# Patient Record
Sex: MALE | Race: WHITE | NOT HISPANIC OR LATINO | Employment: UNEMPLOYED | URBAN - METROPOLITAN AREA
[De-identification: names, ages, dates, MRNs, and addresses within clinical notes are randomized per-mention and may not be internally consistent; named-entity substitution may affect disease eponyms.]

---

## 2023-10-22 ENCOUNTER — HOSPITAL ENCOUNTER (EMERGENCY)
Facility: HOSPITAL | Age: 48
Discharge: PSYCHIATRIC OR SUBSTANCE USE FACILITY | End: 2023-10-23
Attending: FAMILY MEDICINE
Payer: COMMERCIAL

## 2023-10-22 DIAGNOSIS — R45.851 SUICIDAL IDEATIONS: Primary | ICD-10-CM

## 2023-10-22 LAB
ALBUMIN SERPL BCP-MCNC: 3.9 G/DL (ref 3.5–5.2)
ALP SERPL-CCNC: 153 U/L (ref 55–135)
ALT SERPL W/O P-5'-P-CCNC: 38 U/L (ref 10–44)
AMPHET+METHAMPHET UR QL: NEGATIVE
ANION GAP SERPL CALC-SCNC: 12 MMOL/L (ref 8–16)
APAP SERPL-MCNC: <3 UG/ML (ref 10–20)
AST SERPL-CCNC: 32 U/L (ref 10–40)
BACTERIA #/AREA URNS HPF: ABNORMAL /HPF
BARBITURATES UR QL SCN>200 NG/ML: NEGATIVE
BASOPHILS # BLD AUTO: 0.05 K/UL (ref 0–0.2)
BASOPHILS NFR BLD: 0.8 % (ref 0–1.9)
BENZODIAZ UR QL SCN>200 NG/ML: NEGATIVE
BILIRUB SERPL-MCNC: 0.3 MG/DL (ref 0.1–1)
BILIRUB UR QL STRIP: NEGATIVE
BUN SERPL-MCNC: 7 MG/DL (ref 6–20)
BZE UR QL SCN: NEGATIVE
CALCIUM SERPL-MCNC: 9 MG/DL (ref 8.7–10.5)
CANNABINOIDS UR QL SCN: NEGATIVE
CHLORIDE SERPL-SCNC: 103 MMOL/L (ref 95–110)
CLARITY UR: CLEAR
CO2 SERPL-SCNC: 25 MMOL/L (ref 23–29)
COLOR UR: COLORLESS
CREAT SERPL-MCNC: 1 MG/DL (ref 0.5–1.4)
CREAT UR-MCNC: 56.5 MG/DL (ref 23–375)
DIFFERENTIAL METHOD: NORMAL
EOSINOPHIL # BLD AUTO: 0.2 K/UL (ref 0–0.5)
EOSINOPHIL NFR BLD: 2.9 % (ref 0–8)
ERYTHROCYTE [DISTWIDTH] IN BLOOD BY AUTOMATED COUNT: 13.1 % (ref 11.5–14.5)
EST. GFR  (NO RACE VARIABLE): >60 ML/MIN/1.73 M^2
ETHANOL SERPL-MCNC: <10 MG/DL
GLUCOSE SERPL-MCNC: 83 MG/DL (ref 70–110)
GLUCOSE UR QL STRIP: NEGATIVE
HCT VFR BLD AUTO: 44.4 % (ref 40–54)
HCV AB SERPL QL IA: NEGATIVE
HEP C VIRUS HOLD SPECIMEN: NORMAL
HGB BLD-MCNC: 15 G/DL (ref 14–18)
HGB UR QL STRIP: NEGATIVE
IMM GRANULOCYTES # BLD AUTO: 0.02 K/UL (ref 0–0.04)
IMM GRANULOCYTES NFR BLD AUTO: 0.3 % (ref 0–0.5)
KETONES UR QL STRIP: NEGATIVE
LEUKOCYTE ESTERASE UR QL STRIP: ABNORMAL
LYMPHOCYTES # BLD AUTO: 2.2 K/UL (ref 1–4.8)
LYMPHOCYTES NFR BLD: 36.5 % (ref 18–48)
MCH RBC QN AUTO: 28.1 PG (ref 27–31)
MCHC RBC AUTO-ENTMCNC: 33.8 G/DL (ref 32–36)
MCV RBC AUTO: 83 FL (ref 82–98)
METHADONE UR QL SCN>300 NG/ML: NEGATIVE
MICROSCOPIC COMMENT: ABNORMAL
MONOCYTES # BLD AUTO: 0.5 K/UL (ref 0.3–1)
MONOCYTES NFR BLD: 8 % (ref 4–15)
NEUTROPHILS # BLD AUTO: 3.2 K/UL (ref 1.8–7.7)
NEUTROPHILS NFR BLD: 51.5 % (ref 38–73)
NITRITE UR QL STRIP: NEGATIVE
NRBC BLD-RTO: 0 /100 WBC
OPIATES UR QL SCN: NEGATIVE
PCP UR QL SCN>25 NG/ML: NEGATIVE
PH UR STRIP: 7 [PH] (ref 5–8)
PLATELET # BLD AUTO: 254 K/UL (ref 150–450)
PMV BLD AUTO: 10.1 FL (ref 9.2–12.9)
POTASSIUM SERPL-SCNC: 3.7 MMOL/L (ref 3.5–5.1)
PROT SERPL-MCNC: 6.8 G/DL (ref 6–8.4)
PROT UR QL STRIP: NEGATIVE
RBC # BLD AUTO: 5.34 M/UL (ref 4.6–6.2)
RBC #/AREA URNS HPF: 0 /HPF (ref 0–4)
SARS-COV-2 RDRP RESP QL NAA+PROBE: NEGATIVE
SODIUM SERPL-SCNC: 140 MMOL/L (ref 136–145)
SP GR UR STRIP: 1.01 (ref 1–1.03)
TOXICOLOGY INFORMATION: NORMAL
TSH SERPL DL<=0.005 MIU/L-ACNC: 2.18 UIU/ML (ref 0.4–4)
URN SPEC COLLECT METH UR: ABNORMAL
UROBILINOGEN UR STRIP-ACNC: NEGATIVE EU/DL
WBC # BLD AUTO: 6.13 K/UL (ref 3.9–12.7)
WBC #/AREA URNS HPF: 33 /HPF (ref 0–5)
WBC CLUMPS URNS QL MICRO: ABNORMAL

## 2023-10-22 PROCEDURE — 85025 COMPLETE CBC W/AUTO DIFF WBC: CPT | Performed by: FAMILY MEDICINE

## 2023-10-22 PROCEDURE — 80307 DRUG TEST PRSMV CHEM ANLYZR: CPT | Performed by: FAMILY MEDICINE

## 2023-10-22 PROCEDURE — 80053 COMPREHEN METABOLIC PANEL: CPT | Performed by: FAMILY MEDICINE

## 2023-10-22 PROCEDURE — U0002 COVID-19 LAB TEST NON-CDC: HCPCS | Performed by: FAMILY MEDICINE

## 2023-10-22 PROCEDURE — 87086 URINE CULTURE/COLONY COUNT: CPT | Performed by: FAMILY MEDICINE

## 2023-10-22 PROCEDURE — 86803 HEPATITIS C AB TEST: CPT | Performed by: FAMILY MEDICINE

## 2023-10-22 PROCEDURE — 80143 DRUG ASSAY ACETAMINOPHEN: CPT | Performed by: FAMILY MEDICINE

## 2023-10-22 PROCEDURE — 99285 EMERGENCY DEPT VISIT HI MDM: CPT

## 2023-10-22 PROCEDURE — 84443 ASSAY THYROID STIM HORMONE: CPT | Performed by: FAMILY MEDICINE

## 2023-10-22 PROCEDURE — 82077 ASSAY SPEC XCP UR&BREATH IA: CPT | Performed by: FAMILY MEDICINE

## 2023-10-22 PROCEDURE — 81000 URINALYSIS NONAUTO W/SCOPE: CPT | Performed by: FAMILY MEDICINE

## 2023-10-23 ENCOUNTER — HOSPITAL ENCOUNTER (INPATIENT)
Facility: HOSPITAL | Age: 48
LOS: 10 days | Discharge: HOME | DRG: 885 | End: 2023-11-02
Attending: STUDENT IN AN ORGANIZED HEALTH CARE EDUCATION/TRAINING PROGRAM | Admitting: STUDENT IN AN ORGANIZED HEALTH CARE EDUCATION/TRAINING PROGRAM
Payer: COMMERCIAL

## 2023-10-23 VITALS
OXYGEN SATURATION: 99 % | SYSTOLIC BLOOD PRESSURE: 147 MMHG | BODY MASS INDEX: 24.11 KG/M2 | HEART RATE: 83 BPM | TEMPERATURE: 98 F | HEIGHT: 70 IN | DIASTOLIC BLOOD PRESSURE: 93 MMHG | RESPIRATION RATE: 18 BRPM

## 2023-10-23 DIAGNOSIS — F33.2 MDD (MAJOR DEPRESSIVE DISORDER), RECURRENT SEVERE, WITHOUT PSYCHOSIS: Primary | ICD-10-CM

## 2023-10-23 DIAGNOSIS — R45.851 SUICIDAL IDEATION: ICD-10-CM

## 2023-10-23 PROBLEM — N39.0 COMPLICATED UTI (URINARY TRACT INFECTION): Status: ACTIVE | Noted: 2023-10-23

## 2023-10-23 PROBLEM — F17.200 TOBACCO USE DISORDER: Status: ACTIVE | Noted: 2023-10-23

## 2023-10-23 PROBLEM — R82.90 ABNORMAL URINALYSIS: Status: ACTIVE | Noted: 2023-10-23

## 2023-10-23 LAB
CHOLEST SERPL-MCNC: 174 MG/DL (ref 120–199)
CHOLEST/HDLC SERPL: 5.1 {RATIO} (ref 2–5)
ESTIMATED AVG GLUCOSE: 103 MG/DL (ref 68–131)
HBA1C MFR BLD: 5.2 % (ref 4–5.6)
HDLC SERPL-MCNC: 34 MG/DL (ref 40–75)
HDLC SERPL: 19.5 % (ref 20–50)
LDLC SERPL CALC-MCNC: 104.6 MG/DL (ref 63–159)
NONHDLC SERPL-MCNC: 140 MG/DL
TRIGL SERPL-MCNC: 177 MG/DL (ref 30–150)

## 2023-10-23 PROCEDURE — 25000003 PHARM REV CODE 250: Performed by: STUDENT IN AN ORGANIZED HEALTH CARE EDUCATION/TRAINING PROGRAM

## 2023-10-23 PROCEDURE — 83036 HEMOGLOBIN GLYCOSYLATED A1C: CPT | Performed by: STUDENT IN AN ORGANIZED HEALTH CARE EDUCATION/TRAINING PROGRAM

## 2023-10-23 PROCEDURE — 36415 COLL VENOUS BLD VENIPUNCTURE: CPT | Performed by: STUDENT IN AN ORGANIZED HEALTH CARE EDUCATION/TRAINING PROGRAM

## 2023-10-23 PROCEDURE — 99223 PR INITIAL HOSPITAL CARE,LEVL III: ICD-10-PCS | Mod: ,,, | Performed by: PSYCHIATRY & NEUROLOGY

## 2023-10-23 PROCEDURE — 90833 PSYTX W PT W E/M 30 MIN: CPT | Mod: ,,, | Performed by: PSYCHIATRY & NEUROLOGY

## 2023-10-23 PROCEDURE — 11400000 HC PSYCH PRIVATE ROOM

## 2023-10-23 PROCEDURE — 99231 SBSQ HOSP IP/OBS SF/LOW 25: CPT | Mod: ,,, | Performed by: PHYSICIAN ASSISTANT

## 2023-10-23 PROCEDURE — 25000003 PHARM REV CODE 250: Performed by: PSYCHIATRY & NEUROLOGY

## 2023-10-23 PROCEDURE — 80061 LIPID PANEL: CPT | Performed by: STUDENT IN AN ORGANIZED HEALTH CARE EDUCATION/TRAINING PROGRAM

## 2023-10-23 PROCEDURE — 90833 PR PSYCHOTHERAPY W/PATIENT W/E&M, 30 MIN (ADD ON): ICD-10-PCS | Mod: ,,, | Performed by: PSYCHIATRY & NEUROLOGY

## 2023-10-23 PROCEDURE — S4991 NICOTINE PATCH NONLEGEND: HCPCS | Performed by: STUDENT IN AN ORGANIZED HEALTH CARE EDUCATION/TRAINING PROGRAM

## 2023-10-23 PROCEDURE — 99231 PR SUBSEQUENT HOSPITAL CARE,LEVL I: ICD-10-PCS | Mod: ,,, | Performed by: PHYSICIAN ASSISTANT

## 2023-10-23 PROCEDURE — 99223 1ST HOSP IP/OBS HIGH 75: CPT | Mod: ,,, | Performed by: PSYCHIATRY & NEUROLOGY

## 2023-10-23 RX ORDER — OLANZAPINE 10 MG/2ML
10 INJECTION, POWDER, FOR SOLUTION INTRAMUSCULAR EVERY 8 HOURS PRN
Status: DISCONTINUED | OUTPATIENT
Start: 2023-10-23 | End: 2023-11-02 | Stop reason: HOSPADM

## 2023-10-23 RX ORDER — OLANZAPINE 10 MG/1
10 TABLET ORAL EVERY 8 HOURS PRN
Status: DISCONTINUED | OUTPATIENT
Start: 2023-10-23 | End: 2023-11-02 | Stop reason: HOSPADM

## 2023-10-23 RX ORDER — BUPROPION HYDROCHLORIDE 150 MG/1
150 TABLET ORAL DAILY
Status: DISCONTINUED | OUTPATIENT
Start: 2023-10-23 | End: 2023-10-26

## 2023-10-23 RX ORDER — TALC
6 POWDER (GRAM) TOPICAL NIGHTLY PRN
Status: DISCONTINUED | OUTPATIENT
Start: 2023-10-23 | End: 2023-11-02 | Stop reason: HOSPADM

## 2023-10-23 RX ORDER — SERTRALINE HYDROCHLORIDE 25 MG/1
25 TABLET, FILM COATED ORAL DAILY
Status: DISCONTINUED | OUTPATIENT
Start: 2023-10-23 | End: 2023-10-24

## 2023-10-23 RX ORDER — IBUPROFEN 200 MG
1 TABLET ORAL DAILY
Status: DISCONTINUED | OUTPATIENT
Start: 2023-10-23 | End: 2023-11-02 | Stop reason: HOSPADM

## 2023-10-23 RX ORDER — HYDROXYZINE HYDROCHLORIDE 25 MG/1
50 TABLET, FILM COATED ORAL EVERY 6 HOURS PRN
Status: DISCONTINUED | OUTPATIENT
Start: 2023-10-23 | End: 2023-11-02 | Stop reason: HOSPADM

## 2023-10-23 RX ADMIN — BUPROPION HYDROCHLORIDE 150 MG: 150 TABLET, EXTENDED RELEASE ORAL at 11:10

## 2023-10-23 RX ADMIN — SERTRALINE HYDROCHLORIDE 25 MG: 25 TABLET ORAL at 11:10

## 2023-10-23 RX ADMIN — HYDROXYZINE HYDROCHLORIDE 50 MG: 25 TABLET, FILM COATED ORAL at 04:10

## 2023-10-23 RX ADMIN — NICOTINE 1 PATCH: 14 PATCH, EXTENDED RELEASE TRANSDERMAL at 09:10

## 2023-10-23 NOTE — PLAN OF CARE
Lying quietly in bed, eyes closed, respirations even, unlabored. Apparently asleep. Slept 1 hour thus far without interruption. Patient was a late admit.                                                                               Safety precautions maintained. Rounds done every 15 minutes. Bed is fixed in low position and room is uncluttered and pathways are clear.

## 2023-10-23 NOTE — NURSING
Wheeled to 2nd floor then ambulated steadily on to unit after scanned with [employer] Proscreen 200.  Introduced to staff and oriented pt to unit.  Full skin assessment done.  No contraband found.

## 2023-10-23 NOTE — NURSING
"Nursing assessment completed.  Admits to feeling depressed but said he's too tired to discuss stressors.  Denies homicidal thoughts.  When asked about suicidal thoughts, said "I'm in between right now.  I don't know.  I need some sleep".  States he attempted to hang self and kill self with carbon monoxide poisoning 2 years ago.  Rated anxiety "5/10" and depression "10/10".  Flat affect.  Said he thinks he accidentally overdosed the other night "on meth".  "I was at [facility] where I lost my dentures".  Dentures x 4 years.  Decreased sleep and decreased appetite.  Denies hallucinations "right now".  States he wants to be here and wants help.  At least 2 previous psychiatric admissions in life.  Goal: "Improved mood and more.  I'm going to have to think about that".  Lives alone in apartment.  Hoping he still has it when he's discharged.  "It was messy when I left".  Rent paid up until November 1st.  "I pay him cash".  Red bumps noted on arms and chest.  He said these itch him.  Hydroxyzine 50mg po given.  Scratches noted to arms and legs and feet.  Small burn noted to left forearm.  2 scabs noted to face.  Pt said [place] the woods.  Hx HTN.  Denies any home meds.  Smokes about 20 cigarettes daily.  Denies ETOH use.  Said the other day he used "meth for the 3rd time in 2 years".      Plan of care reviewed with pt.    " Yes 08-Mar-2021

## 2023-10-23 NOTE — NURSING
Report called by Elaine Mendoza at .  Medically cleared by Dr Eagle.  Last /93, P81, T98.4, R18, O2 sat 99%.  NKA.  SI.  Relapsed on meth last week and having a hard time since.  COVID negative.  Labs insignificant.  Very pleasant and cooperative.

## 2023-10-23 NOTE — PLAN OF CARE
Verbal contract for safety obtained. VSS.  Flat, restricted affect. Isolating to self. Ambulates. Encouraged to attend groups. Accepts meals. Monitored Q/15 minutes for fall and safety precautions per staff.  Will continue to monitor for needs and safety.

## 2023-10-23 NOTE — HPI
Patient is a 48 year old male with medical history of anxiety, depression and tobacco use disorder who was admitted for suicidal ideation.  Hospital medicine consulted for medical management.

## 2023-10-23 NOTE — ED PROVIDER NOTES
SCRIBE #1 NOTE: I, Doc Monik, am scribing for, and in the presence of, Kiya Eagle MD. I have scribed the entire note.       History     Chief Complaint   Patient presents with    Psychiatric Evaluation     Pt to ED via [facility] reports pt SI w/o plan. NAD. Mental health hx. Off meds for unk length of time.     Review of patient's allergies indicates:  No Known Allergies      History of Present Illness     HPI    10/22/2023, 8:38 PM  History obtained from the patient      History of Present Illness: Josh Tucker is a 48 y.o. male patient with a PMHx of substance abuse who presents to the Emergency Department for psychiatric evaluation. Symptoms are constant and moderate in severity. No mitigating or exacerbating factors reported. Associated sxs include SI without a plan which onset 2 days ago. Patient denies any AVH, HI, CP, SOB, n/v/d, dysuria, HA, and all other sxs at this time. No further complaints or concerns at this time.       Arrival mode: [facility]    PCP: No, Primary Doctor        Past Medical History:  No past medical history on file.    Past Surgical History:  No past surgical history on file.      Family History:  No family history on file.    Social History:  Social History     Tobacco Use    Smoking status: Every Day     Current packs/day: 1.00     Types: Cigarettes    Smokeless tobacco: Current     Types: Snuff   Substance and Sexual Activity    Alcohol use: Yes     Alcohol/week: 6.0 standard drinks of alcohol     Types: 6 Cans of beer per week    Drug use: Yes     Types: Methamphetamines    Sexual activity: Not on file        Review of Systems     Review of Systems   Constitutional:  Negative for fever.   HENT:  Negative for sore throat.    Respiratory:  Negative for shortness of breath.    Cardiovascular:  Negative for chest pain.   Gastrointestinal:  Negative for nausea.   Genitourinary:  Negative for dysuria.   Musculoskeletal:  Negative for back pain.   Skin:  Negative for rash.  "  Neurological:  Negative for weakness.   Hematological:  Does not bruise/bleed easily.   Psychiatric/Behavioral:  Positive for suicidal ideas. Negative for dysphoric mood and hallucinations.         (-) HI   All other systems reviewed and are negative.       Physical Exam     Initial Vitals [10/22/23 1930]   BP Pulse Resp Temp SpO2   (!) 154/101 77 14 98.1 °F (36.7 °C) 98 %      MAP       --          Physical Exam   Nursing Notes and Vital Signs Reviewed.  Constitutional: Patient is in no acute distress. Well-developed and well-nourished.  Head: Atraumatic. Normocephalic.  Eyes: PERRL. EOM intact. Conjunctivae are not pale. No scleral icterus.  ENT: Mucous membranes are moist. Oropharynx is clear and symmetric.    Neck: Supple. Full ROM. No lymphadenopathy.  Cardiovascular: Regular rate. Regular rhythm. No murmurs, rubs, or gallops. Distal pulses are 2+ and symmetric.  Pulmonary/Chest: No respiratory distress. Clear to auscultation bilaterally. No wheezing or rales.  Abdominal: Soft and non-distended.  There is no tenderness.  No rebound, guarding, or rigidity. Good bowel sounds.  Genitourinary: No CVA tenderness  Musculoskeletal: Moves all extremities. No obvious deformities. No edema. No calf tenderness.  Skin: Warm and dry.  Neurological:  Alert, awake, and appropriate.  Normal speech.  No acute focal neurological deficits are appreciated.  Psychiatric: A&Ox3. Cooperative. Negative for delusions, AVH. Positive for SI     ED Course   Procedures  ED Vital Signs:  Vitals:    10/22/23 1930   BP: (!) 154/101   Pulse: 77   Resp: 14   Temp: 98.1 °F (36.7 °C)   TempSrc: Oral   SpO2: 98%   Height: 5' 10" (1.778 m)       Abnormal Lab Results:  Labs Reviewed   COMPREHENSIVE METABOLIC PANEL - Abnormal; Notable for the following components:       Result Value    Alkaline Phosphatase 153 (*)     All other components within normal limits    Narrative:     Release to patient->Immediate   URINALYSIS, REFLEX TO URINE CULTURE - " Abnormal; Notable for the following components:    Color, UA Colorless (*)     Leukocytes, UA 2+ (*)     All other components within normal limits    Narrative:     Specimen Source->Urine   ACETAMINOPHEN LEVEL - Abnormal; Notable for the following components:    Acetaminophen (Tylenol), Serum <3.0 (*)     All other components within normal limits    Narrative:     Release to patient->Immediate   URINALYSIS MICROSCOPIC - Abnormal; Notable for the following components:    WBC, UA 33 (*)     WBC Clumps, UA Few (*)     All other components within normal limits    Narrative:     Specimen Source->Urine   CULTURE, URINE   HEPATITIS C ANTIBODY    Narrative:     Release to patient->Immediate   HEP C VIRUS HOLD SPECIMEN    Narrative:     Release to patient->Immediate   CBC W/ AUTO DIFFERENTIAL    Narrative:     Release to patient->Immediate   TSH    Narrative:     Release to patient->Immediate   DRUG SCREEN PANEL, URINE EMERGENCY    Narrative:     Specimen Source->Urine   ALCOHOL,MEDICAL (ETHANOL)    Narrative:     Release to patient->Immediate   SARS-COV-2 RNA AMPLIFICATION, QUAL   HIV 1 / 2 ANTIBODY        All Lab Results:  Results for orders placed or performed during the hospital encounter of 10/22/23   Hepatitis C Antibody   Result Value Ref Range    Hepatitis C Ab Negative Negative   HCV Virus Hold Specimen   Result Value Ref Range    HEP C Virus Hold Specimen Hold for HCV sendout    CBC auto differential   Result Value Ref Range    WBC 6.13 3.90 - 12.70 K/uL    RBC 5.34 4.60 - 6.20 M/uL    Hemoglobin 15.0 14.0 - 18.0 g/dL    Hematocrit 44.4 40.0 - 54.0 %    MCV 83 82 - 98 fL    MCH 28.1 27.0 - 31.0 pg    MCHC 33.8 32.0 - 36.0 g/dL    RDW 13.1 11.5 - 14.5 %    Platelets 254 150 - 450 K/uL    MPV 10.1 9.2 - 12.9 fL    Immature Granulocytes 0.3 0.0 - 0.5 %    Gran # (ANC) 3.2 1.8 - 7.7 K/uL    Immature Grans (Abs) 0.02 0.00 - 0.04 K/uL    Lymph # 2.2 1.0 - 4.8 K/uL    Mono # 0.5 0.3 - 1.0 K/uL    Eos # 0.2 0.0 - 0.5 K/uL     Baso # 0.05 0.00 - 0.20 K/uL    nRBC 0 0 /100 WBC    Gran % 51.5 38.0 - 73.0 %    Lymph % 36.5 18.0 - 48.0 %    Mono % 8.0 4.0 - 15.0 %    Eosinophil % 2.9 0.0 - 8.0 %    Basophil % 0.8 0.0 - 1.9 %    Differential Method Automated    Comprehensive metabolic panel   Result Value Ref Range    Sodium 140 136 - 145 mmol/L    Potassium 3.7 3.5 - 5.1 mmol/L    Chloride 103 95 - 110 mmol/L    CO2 25 23 - 29 mmol/L    Glucose 83 70 - 110 mg/dL    BUN 7 6 - 20 mg/dL    Creatinine 1.0 0.5 - 1.4 mg/dL    Calcium 9.0 8.7 - 10.5 mg/dL    Total Protein 6.8 6.0 - 8.4 g/dL    Albumin 3.9 3.5 - 5.2 g/dL    Total Bilirubin 0.3 0.1 - 1.0 mg/dL    Alkaline Phosphatase 153 (H) 55 - 135 U/L    AST 32 10 - 40 U/L    ALT 38 10 - 44 U/L    eGFR >60 >60 mL/min/1.73 m^2    Anion Gap 12 8 - 16 mmol/L   TSH   Result Value Ref Range    TSH 2.177 0.400 - 4.000 uIU/mL   Urinalysis, Reflex to Urine Culture Urine, Clean Catch    Specimen: Urine   Result Value Ref Range    Specimen UA Urine, Clean Catch     Color, UA Colorless (A) Yellow, Straw, Tsih    Appearance, UA Clear Clear    pH, UA 7.0 5.0 - 8.0    Specific Gravity, UA 1.010 1.005 - 1.030    Protein, UA Negative Negative    Glucose, UA Negative Negative    Ketones, UA Negative Negative    Bilirubin (UA) Negative Negative    Occult Blood UA Negative Negative    Nitrite, UA Negative Negative    Urobilinogen, UA Negative <2.0 EU/dL    Leukocytes, UA 2+ (A) Negative   Drug screen panel, emergency   Result Value Ref Range    Benzodiazepines Negative Negative    Methadone metabolites Negative Negative    Cocaine (Metab.) Negative Negative    Opiate Scrn, Ur Negative Negative    Barbiturate Screen, Ur Negative Negative    Amphetamine Screen, Ur Negative Negative    THC Negative Negative    Phencyclidine Negative Negative    Creatinine, Urine 56.5 23.0 - 375.0 mg/dL    Toxicology Information SEE COMMENT    Ethanol   Result Value Ref Range    Alcohol, Serum <10 <10 mg/dL   Acetaminophen level    Result Value Ref Range    Acetaminophen (Tylenol), Serum <3.0 (L) 10.0 - 20.0 ug/mL   COVID-19 Rapid Screening   Result Value Ref Range    SARS-CoV-2 RNA, Amplification, Qual Negative Negative   Urinalysis Microscopic   Result Value Ref Range    RBC, UA 0 0 - 4 /hpf    WBC, UA 33 (H) 0 - 5 /hpf    WBC Clumps, UA Few (A) None-Rare    Bacteria Rare None-Occ /hpf    Microscopic Comment SEE COMMENT          Imaging Results:  Imaging Results    None            The Emergency Provider reviewed the vital signs and test results, which are outlined above.     ED Discussion     8:21 PM: The PEC hold has been issued by Dr. Eagle at this time for grave disability.    8:41 PM: Pt has been medically cleared by Dr. Eagle at this time. Reassessed pt at this time. Pt is resting comfortably and appears in no acute distress. There are no psychiatric services offered at this facility. D/w pt all pertinent ED information and plan to transfer to psychiatric facility for psychiatric treatment. Pt verbalizes understanding. Patient being transferred by [facility] for ongoing personal protection en route. Pt has been made aware of all risks and benefits associated with transfer, including but not limited to death, MVC, loss of vital signs, and/or permanent disability. Benefits include ability to be treated at an inpatient psychiatric facility. Pt will be transported by personnel trained in CPR and CPI. Patient understands that there could be unforeseen motor vehicle accidents, inclement weather, or loss of vital signs that could result in potential death or permanent disability. All questions and complaints have been addressed at this time. Pt condition is stable at this time and is clear to transfer to psychiatric facility at this time.            Medical Decision Making  Amount and/or Complexity of Data Reviewed  Labs: ordered. Decision-making details documented in ED Course.                 ED Medication(s):  Medications - No data to  display    New Prescriptions    No medications on file               Scribe Attestation:   Scribe #1: I performed the above scribed service and the documentation accurately describes the services I performed. I attest to the accuracy of the note.     Attending:   Physician Attestation Statement for Scribe #1: I, Kiya Eagle MD, personally performed the services described in this documentation, as scribed by Doc Ruggiero, in my presence, and it is both accurate and complete.           Clinical Impression       ICD-10-CM ICD-9-CM   1. Suicidal ideations  R45.851 V62.84       Disposition:   Disposition: Transferred  Condition: Stable         Kiya Eagle MD  10/22/23 7762

## 2023-10-23 NOTE — H&P
[facility] Hospital Medicine  History & Physical    Patient Name: Josh Tucker  MRN: [id number]  Patient Class: IP- Psych  Admission Date: 10/23/2023  Attending Physician: Bao Bear III, MD   Primary Care Provider: Lesa Primary Doctor         Patient information was obtained from patient and ER records.     Subjective:     Principal Problem:Suicidal ideation    Chief Complaint: No chief complaint on file.       HPI: Patient is a 48 year old male with medical history of anxiety, depression and tobacco use disorder who was admitted for suicidal ideation.  Hospital medicine consulted for medical management.              No past medical history on file.    No past surgical history on file.    Review of patient's allergies indicates:  No Known Allergies    No current facility-administered medications on file prior to encounter.     No current outpatient medications on file prior to encounter.     Family History    None       Tobacco Use    Smoking status: Every Day     Current packs/day: 1.00     Types: Cigarettes    Smokeless tobacco: Current     Types: Snuff   Substance and Sexual Activity    Alcohol use: Yes     Alcohol/week: 6.0 standard drinks of alcohol     Types: 6 Cans of beer per week    Drug use: Yes     Types: Methamphetamines    Sexual activity: Not on file     Review of Systems   Constitutional:  Negative for chills and fever.   Respiratory:  Negative for cough, shortness of breath and wheezing.    Cardiovascular:  Negative for chest pain and palpitations.   Gastrointestinal:  Negative for abdominal distention, constipation, diarrhea, nausea and vomiting.   Genitourinary:  Negative for decreased urine volume, difficulty urinating, dysuria, enuresis, flank pain, frequency, hematuria, penile discharge, penile pain and urgency.     Objective:     Vital Signs (Most Recent):  Temp: 97.9 °F (36.6 °C) (10/23/23 0738)  Pulse: 64 (10/23/23 0738)  Resp: 18 (10/23/23 0738)  BP:  128/72 (10/23/23 0738)  SpO2: 99 % (10/23/23 0330) Vital Signs (24h Range):  Temp:  [97 °F (36.1 °C)-98.4 °F (36.9 °C)] 97.9 °F (36.6 °C)  Pulse:  [60-83] 64  Resp:  [14-18] 18  SpO2:  [98 %-99 %] 99 %  BP: (128-154)/() 128/72     Weight: 76.1 kg (167 lb 14.1 oz)  Body mass index is 24.09 kg/m².     Physical Exam  Constitutional:       Appearance: Normal appearance.   HENT:      Head: Normocephalic and atraumatic.   Cardiovascular:      Rate and Rhythm: Normal rate and regular rhythm.   Pulmonary:      Effort: Pulmonary effort is normal.      Breath sounds: Normal breath sounds.   Abdominal:      General: There is no distension.      Tenderness: There is no abdominal tenderness.   Musculoskeletal:         General: No swelling or tenderness.      Cervical back: Neck supple.      Right lower leg: No edema.      Left lower leg: No edema.   Skin:     General: Skin is warm and dry.   Neurological:      Mental Status: He is alert.      Cranial Nerves: Cranial nerves 2-12 are intact.      Comments: CN II: Visual Fields full to confrontation  CN III, IV , VI PERRL   CN III: no palsy   Nystagmus: none  Diplopia: none  Ophthalmoparesis: none  CN V Facial sensation intact  CN VII facial expression full, symmetric  CN VIII normal  CN IX normal  CN X normal  CN XI normal  CN XII normal           Significant Labs: UPT  No results found for this or any previous visit.  U/A  2+ leukocytes and elevated wbc 33    UDS  Results for orders placed or performed during the hospital encounter of 10/22/23   Drug screen panel, emergency   Result Value Ref Range    Benzodiazepines Negative Negative    Methadone metabolites Negative Negative    Cocaine (Metab.) Negative Negative    Opiate Scrn, Ur Negative Negative    Barbiturate Screen, Ur Negative Negative    Amphetamine Screen, Ur Negative Negative    THC Negative Negative    Phencyclidine Negative Negative    Creatinine, Urine 56.5 23.0 - 375.0 mg/dL    Toxicology Information SEE  COMMENT      CBC  Results for orders placed or performed during the hospital encounter of 10/22/23   CBC auto differential   Result Value Ref Range    WBC 6.13 3.90 - 12.70 K/uL    RBC 5.34 4.60 - 6.20 M/uL    Hemoglobin 15.0 14.0 - 18.0 g/dL    Hematocrit 44.4 40.0 - 54.0 %    MCV 83 82 - 98 fL    MCH 28.1 27.0 - 31.0 pg    MCHC 33.8 32.0 - 36.0 g/dL    RDW 13.1 11.5 - 14.5 %    Platelets 254 150 - 450 K/uL    MPV 10.1 9.2 - 12.9 fL    Immature Granulocytes 0.3 0.0 - 0.5 %    Gran # (ANC) 3.2 1.8 - 7.7 K/uL    Immature Grans (Abs) 0.02 0.00 - 0.04 K/uL    Lymph # 2.2 1.0 - 4.8 K/uL    Mono # 0.5 0.3 - 1.0 K/uL    Eos # 0.2 0.0 - 0.5 K/uL    Baso # 0.05 0.00 - 0.20 K/uL    nRBC 0 0 /100 WBC    Gran % 51.5 38.0 - 73.0 %    Lymph % 36.5 18.0 - 48.0 %    Mono % 8.0 4.0 - 15.0 %    Eosinophil % 2.9 0.0 - 8.0 %    Basophil % 0.8 0.0 - 1.9 %    Differential Method Automated      CMP  Results for orders placed or performed during the hospital encounter of 10/22/23   Comprehensive metabolic panel   Result Value Ref Range    Sodium 140 136 - 145 mmol/L    Potassium 3.7 3.5 - 5.1 mmol/L    Chloride 103 95 - 110 mmol/L    CO2 25 23 - 29 mmol/L    Glucose 83 70 - 110 mg/dL    BUN 7 6 - 20 mg/dL    Creatinine 1.0 0.5 - 1.4 mg/dL    Calcium 9.0 8.7 - 10.5 mg/dL    Total Protein 6.8 6.0 - 8.4 g/dL    Albumin 3.9 3.5 - 5.2 g/dL    Total Bilirubin 0.3 0.1 - 1.0 mg/dL    Alkaline Phosphatase 153 (H) 55 - 135 U/L    AST 32 10 - 40 U/L    ALT 38 10 - 44 U/L    eGFR >60 >60 mL/min/1.73 m^2    Anion Gap 12 8 - 16 mmol/L     TSH  Results for orders placed or performed during the hospital encounter of 10/22/23   TSH   Result Value Ref Range    TSH 2.177 0.400 - 4.000 uIU/mL     ETOH  Results for orders placed or performed during the hospital encounter of 10/22/23   Ethanol   Result Value Ref Range    Alcohol, Serum <10 <10 mg/dL     Salicylate  No results found for this or any previous visit.  Acetaminophen  Results for orders placed or  performed during the hospital encounter of 10/22/23   Acetaminophen level   Result Value Ref Range    Acetaminophen (Tylenol), Serum <3.0 (L) 10.0 - 20.0 ug/mL             Significant Imaging: I have reviewed all pertinent imaging results/findings within the past 24 hours.    Assessment/Plan:     * Suicidal ideation  Defer to psych       Tobacco use disorder  Nicotine patch  Smoking cessation resources      Abnormal urinalysis  Urinalysis positive for 2+ leukocytes and elevated WBC 33 but patient denies all urinary complaints   Since he is asymptomatic will plan to wait for urine culture before prescribing abx.        VTE Risk Mitigation (From admission, onward)    None                         Mica Louise PA-C  Department of Hospital Medicine  [facility]

## 2023-10-23 NOTE — NURSING
Pt accepted to [facility] by Dr Bear.  Sabrina at [facility] said pt would be coming to [facility].

## 2023-10-23 NOTE — PROGRESS NOTES
"   10/23/23 1301   [facility] Group Therapy   Group Name Other  (1:1)   Specific Interventions Leisure Counseling   Participation Level Appropriate   Participation Quality Cooperative   Insight/Motivation Improved   Affect/Mood Display Depressed   Cognition Alert   Psychomotor Other (see comments)  (angelica in bed)     Patient isolating in assigned room, reports "tired, depressed, sleepy" mood, reports he is still having suicidal thoughts, "not as bad." "I relapse using meth. I need better coping skills." Patient accepted a list of 10 coping skills for substance abuse to help prevent relapse.  "

## 2023-10-23 NOTE — NURSING
Rested quietly with closed eyes and even resp for approximately 1 hour (late admit).  Modified VC and all precautions maintained.  Pathways clear and bed in low position.

## 2023-10-23 NOTE — ASSESSMENT & PLAN NOTE
Urinalysis positive for 2+ leukocytes and elevated WBC 33 but patient denies all urinary complaints   Since he is asymptomatic will plan to wait for urine culture before prescribing abx.
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
## 2023-10-23 NOTE — PROGRESS NOTES
10/23/23 1605   [facility] Group Therapy   Group Name Other  (1:1)   Participation Level None   Participation Quality Sleeping     Patient is resting in bed with his eyes closed and did not participate.

## 2023-10-23 NOTE — SUBJECTIVE & OBJECTIVE
No past medical history on file.    No past surgical history on file.    Review of patient's allergies indicates:  No Known Allergies    No current facility-administered medications on file prior to encounter.     No current outpatient medications on file prior to encounter.     Family History    None       Tobacco Use    Smoking status: Every Day     Current packs/day: 1.00     Types: Cigarettes    Smokeless tobacco: Current     Types: Snuff   Substance and Sexual Activity    Alcohol use: Yes     Alcohol/week: 6.0 standard drinks of alcohol     Types: 6 Cans of beer per week    Drug use: Yes     Types: Methamphetamines    Sexual activity: Not on file     Review of Systems   Constitutional:  Negative for chills and fever.   Respiratory:  Negative for cough, shortness of breath and wheezing.    Cardiovascular:  Negative for chest pain and palpitations.   Gastrointestinal:  Negative for abdominal distention, constipation, diarrhea, nausea and vomiting.   Genitourinary:  Negative for decreased urine volume, difficulty urinating, dysuria, enuresis, flank pain, frequency, hematuria, penile discharge, penile pain and urgency.     Objective:     Vital Signs (Most Recent):  Temp: 97.9 °F (36.6 °C) (10/23/23 0738)  Pulse: 64 (10/23/23 0738)  Resp: 18 (10/23/23 0738)  BP: 128/72 (10/23/23 0738)  SpO2: 99 % (10/23/23 0330) Vital Signs (24h Range):  Temp:  [97 °F (36.1 °C)-98.4 °F (36.9 °C)] 97.9 °F (36.6 °C)  Pulse:  [60-83] 64  Resp:  [14-18] 18  SpO2:  [98 %-99 %] 99 %  BP: (128-154)/() 128/72     Weight: 76.1 kg (167 lb 14.1 oz)  Body mass index is 24.09 kg/m².     Physical Exam  Constitutional:       Appearance: Normal appearance.   HENT:      Head: Normocephalic and atraumatic.   Cardiovascular:      Rate and Rhythm: Normal rate and regular rhythm.   Pulmonary:      Effort: Pulmonary effort is normal.      Breath sounds: Normal breath sounds.   Abdominal:      General: There is no distension.      Tenderness: There  is no abdominal tenderness.   Musculoskeletal:         General: No swelling or tenderness.      Cervical back: Neck supple.      Right lower leg: No edema.      Left lower leg: No edema.   Skin:     General: Skin is warm and dry.   Neurological:      Mental Status: He is alert.      Cranial Nerves: Cranial nerves 2-12 are intact.      Comments: CN II: Visual Fields full to confrontation  CN III, IV , VI PERRL   CN III: no palsy   Nystagmus: none  Diplopia: none  Ophthalmoparesis: none  CN V Facial sensation intact  CN VII facial expression full, symmetric  CN VIII normal  CN IX normal  CN X normal  CN XI normal  CN XII normal           Significant Labs: UPT  No results found for this or any previous visit.  U/A  2+ leukocytes and elevated wbc 33    UDS  Results for orders placed or performed during the hospital encounter of 10/22/23   Drug screen panel, emergency   Result Value Ref Range    Benzodiazepines Negative Negative    Methadone metabolites Negative Negative    Cocaine (Metab.) Negative Negative    Opiate Scrn, Ur Negative Negative    Barbiturate Screen, Ur Negative Negative    Amphetamine Screen, Ur Negative Negative    THC Negative Negative    Phencyclidine Negative Negative    Creatinine, Urine 56.5 23.0 - 375.0 mg/dL    Toxicology Information SEE COMMENT      CBC  Results for orders placed or performed during the hospital encounter of 10/22/23   CBC auto differential   Result Value Ref Range    WBC 6.13 3.90 - 12.70 K/uL    RBC 5.34 4.60 - 6.20 M/uL    Hemoglobin 15.0 14.0 - 18.0 g/dL    Hematocrit 44.4 40.0 - 54.0 %    MCV 83 82 - 98 fL    MCH 28.1 27.0 - 31.0 pg    MCHC 33.8 32.0 - 36.0 g/dL    RDW 13.1 11.5 - 14.5 %    Platelets 254 150 - 450 K/uL    MPV 10.1 9.2 - 12.9 fL    Immature Granulocytes 0.3 0.0 - 0.5 %    Gran # (ANC) 3.2 1.8 - 7.7 K/uL    Immature Grans (Abs) 0.02 0.00 - 0.04 K/uL    Lymph # 2.2 1.0 - 4.8 K/uL    Mono # 0.5 0.3 - 1.0 K/uL    Eos # 0.2 0.0 - 0.5 K/uL    Baso # 0.05 0.00 -  0.20 K/uL    nRBC 0 0 /100 WBC    Gran % 51.5 38.0 - 73.0 %    Lymph % 36.5 18.0 - 48.0 %    Mono % 8.0 4.0 - 15.0 %    Eosinophil % 2.9 0.0 - 8.0 %    Basophil % 0.8 0.0 - 1.9 %    Differential Method Automated      CMP  Results for orders placed or performed during the hospital encounter of 10/22/23   Comprehensive metabolic panel   Result Value Ref Range    Sodium 140 136 - 145 mmol/L    Potassium 3.7 3.5 - 5.1 mmol/L    Chloride 103 95 - 110 mmol/L    CO2 25 23 - 29 mmol/L    Glucose 83 70 - 110 mg/dL    BUN 7 6 - 20 mg/dL    Creatinine 1.0 0.5 - 1.4 mg/dL    Calcium 9.0 8.7 - 10.5 mg/dL    Total Protein 6.8 6.0 - 8.4 g/dL    Albumin 3.9 3.5 - 5.2 g/dL    Total Bilirubin 0.3 0.1 - 1.0 mg/dL    Alkaline Phosphatase 153 (H) 55 - 135 U/L    AST 32 10 - 40 U/L    ALT 38 10 - 44 U/L    eGFR >60 >60 mL/min/1.73 m^2    Anion Gap 12 8 - 16 mmol/L     TSH  Results for orders placed or performed during the hospital encounter of 10/22/23   TSH   Result Value Ref Range    TSH 2.177 0.400 - 4.000 uIU/mL     ETOH  Results for orders placed or performed during the hospital encounter of 10/22/23   Ethanol   Result Value Ref Range    Alcohol, Serum <10 <10 mg/dL     Salicylate  No results found for this or any previous visit.  Acetaminophen  Results for orders placed or performed during the hospital encounter of 10/22/23   Acetaminophen level   Result Value Ref Range    Acetaminophen (Tylenol), Serum <3.0 (L) 10.0 - 20.0 ug/mL             Significant Imaging: I have reviewed all pertinent imaging results/findings within the past 24 hours.

## 2023-10-24 LAB — BACTERIA UR CULT: NORMAL

## 2023-10-24 PROCEDURE — 25000003 PHARM REV CODE 250: Performed by: PSYCHIATRY & NEUROLOGY

## 2023-10-24 PROCEDURE — 99233 PR SUBSEQUENT HOSPITAL CARE,LEVL III: ICD-10-PCS | Mod: ,,, | Performed by: PSYCHIATRY & NEUROLOGY

## 2023-10-24 PROCEDURE — 90833 PSYTX W PT W E/M 30 MIN: CPT | Mod: ,,, | Performed by: PSYCHIATRY & NEUROLOGY

## 2023-10-24 PROCEDURE — 11400000 HC PSYCH PRIVATE ROOM

## 2023-10-24 PROCEDURE — 90833 PR PSYCHOTHERAPY W/PATIENT W/E&M, 30 MIN (ADD ON): ICD-10-PCS | Mod: ,,, | Performed by: PSYCHIATRY & NEUROLOGY

## 2023-10-24 PROCEDURE — 99233 SBSQ HOSP IP/OBS HIGH 50: CPT | Mod: ,,, | Performed by: PSYCHIATRY & NEUROLOGY

## 2023-10-24 RX ORDER — SERTRALINE HYDROCHLORIDE 50 MG/1
50 TABLET, FILM COATED ORAL DAILY
Status: DISCONTINUED | OUTPATIENT
Start: 2023-10-25 | End: 2023-10-27

## 2023-10-24 RX ADMIN — HYDROXYZINE HYDROCHLORIDE 50 MG: 25 TABLET, FILM COATED ORAL at 08:10

## 2023-10-24 RX ADMIN — SERTRALINE HYDROCHLORIDE 25 MG: 25 TABLET ORAL at 08:10

## 2023-10-24 RX ADMIN — BUPROPION HYDROCHLORIDE 150 MG: 150 TABLET, EXTENDED RELEASE ORAL at 08:10

## 2023-10-24 NOTE — PROGRESS NOTES
"   10/24/23 1113   Assessment   Patient's Identification of the Problem Patient presents disheveled, anxious, yarning, reports "tired, sleepy" mood. Patient states his admit is due to suicidal thoughts, depression and drug use, "I was trying to get help for rehab." Patient reports he was paranoid, "I know somebody's watching me." Patient admits to negative leisure lifestyle of cigarettes, meth and alcohol(occasionally). Patient reports he is , have 2 children, 10th/GED, unemployed, homeless. Patient verbalized his main goal is to go to rehab.   Leisure Interest Watching TV;Fishing;Listen to Music;Enjoy Family/Friends  (watching sports, interest decrease lately)   Leisure Barriers Homeless;Lack of Transportation;Lack of Finances;ETOH Use;Loss of Interest;Drug Use;Fears/Phobias  ("I know somebody's watching me.)   Treatment Focus To Improve Mood;To Increase Motivation;To Improve Leisure Awareness/Lifestyle/Interest;To Improve Coping Skills;To Promote Successful and Safe Self Expression;To Educate and Increase Awareness of Sober Free Activities       Treatment Recommendation:   1:1 Intervention (as needed)    Cognitive Stimulation Skilled Activity  Mild Exercises Skilled Activity  Coping Skilled Activity  Leisure Education and Awareness Skilled Activity    Treatment Goal(s):  Long Term Goals Refer To Master Treatment Plan    Short Term Treatment Goal(s)  Patient Will:  Comply with Treatment  Exhibit Improvement in Mood  Demonstrate Constructive Expression of Feelings and Behavior  Verbalize Improvement in Mood  Identify at Least 2 Coping Skills or Leisure Skills to Reduce Depression and Hopelessness Upon Request from Therapist  Identify (+) Leisure / Recreation Activities that Promote Wellness and Promote a Sober Lifestyle    Discharge Recommendations:  Encourage Patient to Actively Utilize Available Community Resources to Increase Leisure Involvement to Decrease Signs and Symptoms of Illness  Encourage Patient " to Utilize Coping Skills on a Regular Basis to Reduce the Risk of Decompensating and Re-Hospitalizations  AA/NA Meetings  Follow Up with After Care Appointments

## 2023-10-24 NOTE — PLAN OF CARE
Recommendations  1. Rec continue regular diet.   2. Rec continue monitoring weights at least weekly to assess for any acute weight changes.   3. RD to follow and make recs accordingly.    Interventions  1. Increased protein diet  2. Increased energy diet  3. Collaboration with other providers    Goals: Pt will continue to meet at least 75% ENN by RD follow up.  Nutrition Goal Status: new

## 2023-10-24 NOTE — PLAN OF CARE
"Patient reports feeling "okay" today, spending majority of time in room with minimal interaction with staff and peers. Reports occasionally having SI, but denies SI/HI at present time. Denies hallucinations at this time. Appetite adequate. Denies sleep disturbances. Medication complaint. Remains calm and cooperative. Safety precautions remain in place  "

## 2023-10-24 NOTE — PROGRESS NOTES
"   10/24/23 1130   [facility] Therapy   Group Name Other  (1:1)   Specific Interventions Leisure Counseling   Participation Level Minimal   Participation Quality Cooperative   Insight/Motivation Improved   Affect/Mood Display Anxious   Cognition Alert   Psychomotor WNL     Patient presents anxious, disheveled, reports "tired, sleepy" mood, "I just like confused right now. I'm just trying to figure all this out." Patient identified going to a 12-step program and staying healthy(eating right, exercise and no drugs) as healthy coping skills. Patient accepted a list of coping skills for substance use.  "

## 2023-10-24 NOTE — PROGRESS NOTES
"PSYCHIATRY DAILY INPATIENT PROGRESS NOTE  SUBSEQUENT HOSPITAL VISIT    ENCOUNTER DATE: 10/24/2023  SITE: [facility]    DATE OF ADMISSION: 10/23/2023  3:23 AM  LENGTH OF STAY: 1 days      CHIEF COMPLAINT   Josh Tucker is a 48 y.o. male, seen during daily persaud rounds on the inpatient unit.  Josh Tucker presented with the chief complaint of  depression/addiction/SI, "I was having suicidal thoughts."      The patient was seen and examined. The chart was reviewed.     Reviewed notes from Rns, PA, MD, CTRS, RD, and SW and labs from the last 24 hours.    The patient's case was discussed with the treatment team/care providers today including Rns, Speciality services, and SW    Staff reports no behavioral or management issues.     The patient has been compliant with treatment.      Subjective 10/24/2023       Today the patient reports that he feels about the same. He notes continued symptoms of depression.anxiety.    He discussed his toy standing h/o substance use. Although his UDS was negative, he was just recently hospitalized for drug related overdose (reportedly yin the last week).   -He appears to be having withdrawals which are complicating tx  -he would like rehab once stable      The patient denies any side effects to medications.              Psychiatric ROS (observed, reported, or endorsed/denied):  Depressed mood - Continuing  Interest/pleasure/anhedonia: Continuing  Guilt/hopelessness/worthlessness - Continuing  Changes in Sleep - variable  Changes in Appetite - variable  Changes in Concentration - variable  Changes in Energy - variable  PMA/R- Continuing  Suicidal- active/passive ideations - Continuing  Homicidal ideations: active/passive ideations - No    Hallucinations - No  Delusions - No  Disorganized behavior - No  Disorganized speech - No  Negative symptoms - No    Elevated mood - No  Decreased need for sleep - No  Grandiosity - No  Racing thoughts - No  Impulsivity - " No  Irritability- No  Increased energy - No  Distractibility - No  Increase in goal-directed activity or PMA- No    Symptoms of NATE - Continuing  Symptoms of Panic Disorder- No  Symptoms of PTSD - No        Overall progress: Patient is showing no improvement on the Unit to date        Psychotherapy:  Target symptoms: depression, anxiety , substance abuse  Why chosen therapy is appropriate versus another modality: relevant to diagnosis, patient responds to this modality, evidence based practice  Outcome monitoring methods: self-report, observation  Therapeutic intervention type: insight oriented psychotherapy, behavior modifying psychotherapy, supportive psychotherapy, interactive psychotherapy  Topics discussed/themes: building skills sets for symptom management, symptom recognition, substance abuse  The patient's response to the intervention is accepting. The patient's progress toward treatment goals is limited.   Duration of intervention: 16 minutes.        Medical ROS  General ROS: negative  Ophthalmic ROS: negative  ENT ROS: negative  Allergy and Immunology ROS: negative  Hematological and Lymphatic ROS: negative  Endocrine ROS: negative  Respiratory ROS: no cough, shortness of breath, or wheezing  Cardiovascular ROS: no chest pain or dyspnea on exertion  Gastrointestinal ROS: no abdominal pain, change in bowel habits, or black or bloody stools  Genito-Urinary ROS: no dysuria, trouble voiding, or hematuria  Musculoskeletal ROS: negative  Neurological ROS: no TIA or stroke symptoms  Dermatological ROS: negative      PAST MEDICAL HISTORY   No past medical history on file.        PSYCHOTROPIC MEDICATIONS   Scheduled Meds:   buPROPion  150 mg Oral Daily    nicotine  1 patch Transdermal Daily    sertraline  25 mg Oral Daily     Continuous Infusions:  PRN Meds:.hydrOXYzine HCL, melatonin, OLANZapine **AND** OLANZapine        EXAMINATION    VITALS   Vitals:    10/23/23 0330 10/23/23 0738 10/23/23 1922 10/24/23 0715  "  BP: 133/87 128/72 117/63 134/78   BP Location: Left arm  Right arm    Patient Position: Sitting  Sitting Lying   Pulse: 60 64 70 72   Resp: 18 18 18 18   Temp: 97 °F (36.1 °C) 97.9 °F (36.6 °C) 98 °F (36.7 °C) 97.9 °F (36.6 °C)   TempSrc: Temporal Temporal Temporal Temporal   SpO2: 99%      Weight: 76.1 kg (167 lb 14.1 oz)      Height: 5' 10" (1.778 m)          Body mass index is 24.09 kg/m².      CONSTITUTIONAL  General Appearance: unremarkable, age appropriate, older than stated age, disheveled     MUSCULOSKELETAL  Muscle Strength and Tone:no dyskinesia, no dystonia, no tremor, no tic  Abnormal Involuntary Movements: No  Gait and Station: non-ataxic     PSYCHIATRIC   Level of Consciousness: awake and alert   Orientation: person, place, time, and situation  Grooming: Casually dressed and Disheveled  Psychomotor Behavior: normal, cooperative, psychomotor retardation, eye contact normal  Speech: normal tone, normal rate, normal pitch, soft, spontaneous  Language: grossly intact, able to name, able to repeat  Mood: anxious and dysphoric  Affect: Anxious, Consistent with mood, Congruent with thought, and Constricted  Thought Process: linear, logical  Associations: intact   Thought Content: +SI, denies HI, and no delusions  Perceptions: denies AH and denies  VH  Memory: Able to recall past events, Remote intact, and Recent intact  Attention:Attends to interview without distraction  Fund of Knowledge: Aware of current events and Vocabulary appropriate   Estimate if Intelligence:  Average based on work/education history, vocabulary and mental status exam  Insight: intact, has awareness of illness- fair  Judgment: behavior is adequate to circumstances- fair    DIAGNOSTIC TESTING   Laboratory Results  No results found for this or any previous visit (from the past 24 hour(s)).          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     Nicotine Dependence  Amphetamine use disorder, severe, continuous with physiological dependence      Psychosocial stressors        PROBLEM LIST AND MANAGEMENT PLANS     Depression: pt counseled  -start trial of Wellbutrin  mg po q day- will increase to 300 mg on day 5 (on day 2)  -start adjunctive Zoloft at 25 mg po q day- increase to 50 mg po q day tomorrow (goal dose is 100-150 mg daily)     Anxiety: pt counseled  -Zoloft as above  -vistaril prn     Insomnia: pt counseled  -vistaril prn     Nicotine Dependence: pt counseled  -started/continue  nicotine 14 mg patch dermal q day     Amphetamine use disorder: pt counseled  -rehab if willing     Psychosocial stressors: pt counseled  -SW consulted to assist with resources          Discussed diagnosis, risks and benefits of proposed treatment vs alternative treatments vs no treatment, potential side effects of these treatments and the inherent unpredictability of treatment. The patient expresses understanding of the above and displays the capacity to agree with this treatment given said understanding. Patient also agrees that, currently, the benefits outweigh the risks and would like to pursue/continue treatment at this time.    Any medications being used off-label were discussed with the patient inclusive of the evidence base for the use of the medications and consent was obtained for the off-label use of the medication.       DISCHARGE PLANNING  Expected Disposition Plan: Home or Self Care/rehab      NEED FOR CONTINUED HOSPITALIZATION  Psychiatric illness continues to pose a potential threat to life or bodily function, of self or others, thereby requiring the need for continued inpatient psychiatric hospitalization: Yes, due to: danger to self, gravely disabled, and suicidal ideation, as evidenced by:  Ongoing concerns with SI. and Ongoing concerns with grave disability with patient unable to perform basic feeding, hygiene and  dressing activities without significant constant support.    Protective inpatient pyschiatric hospitalization required while a safe disposition plan is enacted: Yes    Patient stabilized and ready for discharge from inpatient psychiatric unit: No        STAFF:   Austyn Méndez MD  Psychiatry

## 2023-10-24 NOTE — MEDICAL/APP STUDENT
"PSYCHIATRY DAILY INPATIENT PROGRESS NOTE  SUBSEQUENT HOSPITAL VISIT    ENCOUNTER DATE: 10/24/2023  [facility]    DATE OF ADMISSION: 10/23/2023  3:23 AM  LENGTH OF STAY: 1 days      CHIEF COMPLAINT   Josh Tucker is a 48 y.o. male, seen during daily persaud rounds on the inpatient unit.  Josh Tucker presented with the chief complaint of depression/addiction/SI      The patient was seen and examined. The chart was reviewed.     Reviewed notes from Rns and CTRS and labs from the last 24 hours.    The patient's case was discussed with the treatment team/care providers today including {staffnotes:73804}    Staff reports {staffprobs:47015} behavioral or management issues.     The patient has been {compliance:10546} with treatment.      Subjective 10/24/2023       Today the patient reports he slept well. Patient states that he is "doing well." His mood continues to be depressed. He reports that he has not felt suicidal for the past two days. Patient describes improved appetite and states that he has no concerns about his medications.       The patient denies any side effects to medications.        Interim/overnight events per report/notes:          Psychiatric ROS (observed, reported, or endorsed/denied):  Depressed mood - Continuing  Interest/pleasure/anhedonia: Continuing  Guilt/hopelessness/worthlessness - denies  Changes in Sleep - No  Changes in Appetite - decreasing slowly  Changes in Concentration - Continuing  Changes in Energy - No  PMA/R- No  Suicidal- active/passive ideations - denies  Homicidal ideations: active/passive ideations - No    Hallucinations - No  Delusions - No  Disorganized behavior - No  Disorganized speech - No  Negative symptoms - No    Elevated mood - No  Decreased need for sleep - No  Grandiosity - No  Racing thoughts - No  Impulsivity - No  Irritability- No  Increased energy - No  Distractibility - No  Increase in goal-directed activity or PMA- No    Symptoms of " "NATE - Continuing  Symptoms of Panic Disorder- No  Symptoms of PTSD - No        Overall progress: Patient is showing mild improvement         Psychotherapy:  Target symptoms: {PSY TARGET SYMPTOMS:88963}  Why chosen therapy is appropriate versus another modality: {reason:73387}  Outcome monitoring methods: {methods:43432}  Therapeutic intervention type: {types:15555}  Topics discussed/themes: {Topics:20403}  The patient's response to the intervention is {PSY INTERVENTION RESPONSE:17857}. The patient's progress toward treatment goals is {Progress:20262}.   Duration of intervention: *** minutes.        Medical ROS  ROS      PAST MEDICAL HISTORY   No past medical history on file.        PSYCHOTROPIC MEDICATIONS   Scheduled Meds:   buPROPion  150 mg Oral Daily    nicotine  1 patch Transdermal Daily    sertraline  25 mg Oral Daily     Continuous Infusions:  PRN Meds:.hydrOXYzine HCL, melatonin, OLANZapine **AND** OLANZapine        EXAMINATION    VITALS   Vitals:    10/23/23 0330 10/23/23 0738 10/23/23 1922 10/24/23 0715   BP: 133/87 128/72 117/63 134/78   BP Location: Left arm  Right arm    Patient Position: Sitting  Sitting Lying   Pulse: 60 64 70 72   Resp: 18 18 18 18   Temp: 97 °F (36.1 °C) 97.9 °F (36.6 °C) 98 °F (36.7 °C) 97.9 °F (36.6 °C)   TempSrc: Temporal Temporal Temporal Temporal   SpO2: 99%      Weight: 76.1 kg (167 lb 14.1 oz)      Height: 5' 10" (1.778 m)          Body mass index is 24.09 kg/m².        CONSTITUTIONAL  General Appearance: {appearance:46686::"unremarkable","age appropriate"}    MUSCULOSKELETAL  Muscle Strength and Tone:{Muscle Strength:20210::"no tremor","no tic"}  Abnormal Involuntary Movements: {YES **/NO:45504::"No"}  Gait and Station: {Gait:20211::"non-ataxic"}    PSYCHIATRIC   Level of Consciousness: {FINDINGS; ANE POST LEVEL OF CONSCIOUSNESS:39419::"awake","alert "}  Orientation: {EXAM; PSYCHIATRIC ORIENTATION:20781::"person","place","situation"}  Grooming: {PSY - ROS " "APPEARANCE:20772::"Casually dressed","Well groomed"}  Psychomotor Behavior: {behavior:08239::"normal","cooperative"}  Speech: {findings; speech:99315::"normal tone","normal rate","normal pitch","normal volume"}  Language: {EXAM; AMB PSYCH LANGUAGE:20234::"grossly intact"}  Mood: {PSY - MSE MOOD:20777}  Affect: {Psych Affect:24000::"Consistent with mood"}  Thought Process: {THOUGHT_PROCESS_PSYCH:28367::"linear, logical"}  Associations: {Associations:20224::"intact"}   Thought Content: {tothoughtcontent:21145}  Perceptions: {toperceptions:91256}  Memory: {RP PSY - Memory:83367::"Able to recall past events","Remote intact","Recent intact"}  Attention:{PSY - ATTENTION/CONCENTRATION:21519::"Attends to interview without distraction"}  Fund of Knowledge: {PSY - FUND OF KNOWLEDGE:20785::"Aware of current events","Vocabulary appropriate "}  Estimate if Intelligence:  {Choices:20784::"Average"} based on work/education history, vocabulary and mental status exam  Insight: {insight:21291::"has awareness of illness"}  Judgment: {JUDGMENT:21292::"behavior is adequate to circumstances"}        DIAGNOSTIC TESTING   Laboratory Results  No results found for this or any previous visit (from the past 24 hour(s)).          MEDICAL DECISION MAKING      ASSESSMENT:   ***        PROBLEM LIST AND MANAGEMENT PLANS    ***            Discussed diagnosis, risks and benefits of proposed treatment vs alternative treatments vs no treatment, potential side effects of these treatments and the inherent unpredictability of treatment. The patient expresses understanding of the above and displays the capacity to agree with this treatment given said understanding. Patient also agrees that, currently, the benefits outweigh the risks and would like to pursue/continue treatment at this time.    Any medications being used off-label were discussed with the patient inclusive of the evidence base for the use of the medications and consent was obtained for the " off-label use of the medication.       DISCHARGE PLANNING  Expected Disposition Plan: {Select Anticipated Discharge Plan:42319}      NEED FOR CONTINUED HOSPITALIZATION  Psychiatric illness continues to pose a potential threat to life or bodily function, of self or others, thereby requiring the need for continued inpatient psychiatric hospitalization: Yes, due to: {PSY IP JUSTIFICATION FOR CONTINUED ACUTE CARE HOSPITALIZATION:38866}, as evidenced by:  {just:20538}    Protective inpatient pyschiatric hospitalization required while a safe disposition plan is enacted: Yes    Patient stabilized and ready for discharge from inpatient psychiatric unit: No        STAFF:       Psychiatry

## 2023-10-24 NOTE — PLAN OF CARE
"TREATMENT TEAM      Chief Complaint:  Pt.is a 48 year old male whom presented to the ED and reports SI without a plan     Per ED note pt.has been off medication for unknown time    Pt.stated"I stayed clean for a little while and  then I used and started seeing things"    Pt.stated" I think I overdosed and I went to [facility], they sent me home after cleaning my system out. When I made it home, I left and  I went to a motel and I came here"    Pt.reports no social supports - and he would like to transition to rehab post discharge.           Pt Goal(s):    "To go to rehab"    Current Progress:   Pt attended treatment team dressed in blue hospital scrubs    Pt affect/mood:Pt.presented broad of affect       Program/groups:    Not Attending      Revisions to Plan:    None at this time     "

## 2023-10-24 NOTE — CONSULTS
[facility]  Adult Nutrition  Consult Note    SUMMARY     Recommendations  1. Rec continue regular diet.   2. Rec continue monitoring weights at least weekly to assess for any acute weight changes.   3. RD to follow and make recs accordingly.    Interventions  1. Increased protein diet  2. Increased energy diet  3. Collaboration with other providers    Goals: Pt will continue to meet at least 75% ENN by RD follow up.  Nutrition Goal Status: new  Communication of RD Recs: other (comment) (POC)    Assessment and Plan    Nutrition Problem  Increased protein energy needs    Related to (etiology):   Conditions associated with diagnoses    Signs and Symptoms (as evidenced by):   H/o of methamphetamine abuse    Interventions/Recommendations (treatment strategy):  Recommendations  1. Rec continue regular diet.   2. Rec continue monitoring weights at least weekly to assess for any acute weight changes.   3. RD to follow and make recs accordingly.    Interventions  1. Increased protein diet  2. Increased energy diet  3. Collaboration with other providers    Goals: Pt will continue to meet at least 75% ENN by RD follow up.  Nutrition Goal Status: new    Nutrition Diagnosis Status:   New         Malnutrition Assessment           NFPE -- PEC                            Reason for Assessment    Reason For Assessment: consult  Diagnosis: psychological disorder (SI)  Relevant Medical History: anxiety, depression, HTN  Interdisciplinary Rounds: did not attend  General Information Comments:   RD consult received for 49 yo M. On regular diet -- consuming 100% of meals at this time. Pt has increased nutritional needs for substance abuse; however, patient meeting needs on regular diet. No diet order changes necessary. No recent weight hx found in chart review. Will continue to monitor for any nutrition related changes.    Nutrition Discharge Planning: Pt to dc on general healthful diet    Nutrition Risk  "Screen    Nutrition Risk Screen: no indicators present    Nutrition/Diet History    Food Allergies: NKFA  Factors Affecting Nutritional Intake: None identified at this time, other (see comments) (Patient consuming 100% of meals)    Anthropometrics    Temp: 97.9 °F (36.6 °C)  Height Method: Stated  Height: 5' 10" (177.8 cm)  Height (inches): 70 in  Weight Method: Standard Scale  Weight: 76.1 kg (167 lb 14.1 oz)  Weight (lb): 167.88 lb  Ideal Body Weight (IBW), Male: 166 lb  % Ideal Body Weight, Male (lb): 101.13 %  BMI (Calculated): 24.1  BMI Grade: 18.5-24.9 - normal       Lab/Procedures/Meds    Pertinent Labs Reviewed: reviewed  Pertinent Labs Comments: 10/22/2023 - ALP: 153 (H); 10/23/2023 - T (H)  Pertinent Medications Reviewed: reviewed  Pertinent Medications Comments: Wellbutrin, Zoloft, nicotine patch    Physical Findings/Assessment         Estimated/Assessed Needs    Weight Used For Calorie Calculations: 75.4 kg (166 lb 5.4 oz) (IBW)  Energy Calorie Requirements (kcal): 9418-0551 (30-35 kcal/kg for substance abuse)  Energy Need Method: Kcal/kg  Protein Requirements: 76-91 g (1-1.2 g/kg)  Weight Used For Protein Calculations: 76.1 kg (167 lb 12.3 oz)  Fluid Requirements (mL): 1 mL/kcal  Estimated Fluid Requirement Method: RDA Method (or per MD)  RDA Method (mL): 2264         Nutrition Prescription Ordered    Current Diet Order: regular    Evaluation of Received Nutrient/Fluid Intake    % Kcal Needs: %  % Protein Needs: %  I/O: N/A  Energy Calories Required: meeting needs  Protein Required: meeting needs  Fluid Required: meeting needs  Comments: LBM: 10/20/2023  Tolerance: tolerating  % Intake of Estimated Energy Needs: 75 - 100 %  % Meal Intake: 75 - 100 %    Nutrition Risk    Level of Risk/Frequency of Follow-up: low - moderate (1-2 x per week)       Monitor and Evaluation    Food and Nutrient Intake: energy intake, food and beverage intake  Food and Nutrient Adminstration: diet " order  Knowledge/Beliefs/Attitudes: food and nutrition knowledge/skill, beliefs and attitudes  Physical Activity and Function: nutrition-related ADLs and IADLs, factors affecting access to physical activity  Anthropometric Measurements: height/length, weight, weight change, body mass index  Biochemical Data, Medical Tests and Procedures: electrolyte and renal panel, gastrointestinal profile, glucose/endocrine profile, inflammatory profile, lipid profile       Nutrition Follow-Up    RD Follow-up?: Yes

## 2023-10-24 NOTE — PLAN OF CARE
Pt is sleeping at this time and has slept 8 hours with 1 awakening.  NAD.  Resp even & unlabored.  Pathways clear.  Q 15 minute safety checks ongoing.  All precautions maintained

## 2023-10-24 NOTE — PLAN OF CARE
"Behavioral Health Unit  Psychosocial History and Assessment  Progress Note      Patient Name: Josh Tucker YOB: 1975 SW: Idris Johnson [facility] [place] Date: 10/24/2023    Chief Complaint: depression and suicidal ideation    Consent:     Did the patient consent for an interview with the ? Yes    Did the patient consent for the  to contact family/friend/caregiver?   No    Did the patient give consent for the  to inform family/friend/caregiver of his/her whereabouts or to discuss discharge planning? No    Source of Information: Face to face with patient    Is information obtained from interviews considered reliable?   no    Reason for Admission:     Active Hospital Problems    Diagnosis  POA    *Suicidal ideation [R45.851]  Not Applicable    Abnormal urinalysis [R82.90]  Yes    Tobacco use disorder [F17.200]  Yes      Resolved Hospital Problems   No resolved problems to display.       History of Present Illness - (Patient Perception):   Pt.stated"I had relapsed on meth and started seeing stuff. I parked my vehicle somewhere on the side of road and took some sleeping pills and the next thing I know, I was in the hospital. The people in the emergency room didn't tell me much, they released me to go home. I went to a motel and then a few days later I came here. I called the ambulance and they brought me here because I was thinking about hurting myself by taking a whole bottle of pills "    History of Present Illness - (Perception of Others): Per Psychiatrist H&P:   PSYCHIATRY INPATIENT ADMISSION NOTE - H & P        10/23/2023 9:18 AM   Josh Tucker   1975   [id number]                                        DATE OF ADMISSION: 10/23/2023  3:23 AM     SITE: [facility]     CURRENT LEGAL STATUS: PEC and/or CEC        HISTORY    CHIEF COMPLAINT   Josh Tucker is a 48 y.o. male with a past psychiatric history of depression/anxiety and substance use " "currently admitted to the inpatient unit with the following chief complaint: depression/addiction/SI, "I was having suicidal thoughts."    HPI   The patient was seen and examined. The chart was reviewed.     The patient presented to the ER on 10/23/2023 . Per staff notes:  -Pt to ED via [facility] reports pt SI w/o plan. NAD. Mental health hx. Off meds for unk length of time  -Josh Tucker is a 48 y.o. male patient with a PMHx of substance abuse who presents to the Emergency Department for psychiatric evaluation. Symptoms are constant and moderate in severity. No mitigating or exacerbating factors reported. Associated sxs include SI without a plan which onset 2 days ago. Patient denies any AVH, HI, CP, SOB, n/v/d, dysuria, HA, and all other sxs at this time. No further complaints or concerns at this time  - Medically cleared by Dr Eagle.  Last /93, P81, T98.4, R18, O2 sat 99%.  NKA.  SI.  Relapsed on meth last week and having a hard time since.  COVID negative.  -Admits to feeling depressed but said he's too tired to discuss stressors.  Denies homicidal thoughts.  When asked about suicidal thoughts, said "I'm in between right now.  I don't know.  I need some sleep".  States he attempted to hang self and kill self with carbon monoxide poisoning 2 years ago.  Rated anxiety "5/10" and depression "10/10".  Flat affect.  Said he thinks he accidentally overdosed the other night "on meth".  "I was at [facility] where I lost my dentures".  Dentures x 4 years.  Decreased sleep and decreased appetite.  Denies hallucinations "right now".  States he wants to be here and wants help.  At least 2 previous psychiatric admissions in life.  Goal: "Improved mood and more.  I'm going to have to think about that".  Lives alone in apartment.  Hoping he still has it when he's discharged.  "It was messy when I left".  Rent paid up until November 1st.  "I pay him cash".  Red bumps noted on arms and chest.  He said these " "itch him.  Hydroxyzine 50mg po given.  Scratches noted to arms and legs and feet.  Small burn noted to left forearm.  2 scabs noted to face.  Pt said he was in the woods.  Hx HTN.  Denies any home meds.  Smokes about 20 cigarettes daily.  Denies ETOH use.  Said the other day he used "meth for the 3rd time in 2 years".          The patient was medically cleared and admitted to the [facility].     The patient reports a 10 year h/o depression/anxiety complicated by substance use and severe psychosocial stressors. He went into treatment about 2 years ago and was doing well with rare lapses until about 1 week ago. He had a full relapse with severe usage resulting in severe psychosocial stressors (housing, occupational, financial, and interpersonal). He reports  that he attempted suicide via overdosing within the last week. "I need help."        Symptoms of Depression: diminished mood - Yes, loss of interest/anhedonia - Yes;  recurrent - Yes, >14 days - Yes, diminished energy - Yes, change in sleep - Yes, change in appetite - Yes, diminished concentration or cognition or indecisiveness - Yes, PMA/R -  No, excessive guilt or hopelessness or worthlessness - Yes, suicidal ideations - Yes     Changes in Sleep: trouble with initiation- Yes, maintenance, - Yes early morning awakening with inability to return to sleep - No, hypersomnolence - No     Suicidal- active/passive ideations - Yes, organized plans, future intentions - No     Homicidal ideations: active/passive ideations - No, organized plans, future intentions - No     Symptoms of psychosis: hallucinations - No, delusions - No, disorganized speech - No, disorganized behavior or abnormal motor behavior - No, or negative symptoms (diminshed emotional expression, avolition, anhedonia, alogia, asociality) - No, active phase symptoms >1 month - No, continuous signs of illness > 6 months - No, since onset of illness decreased level of functioning present - No     Symptoms of kwadwo or " "hypomania: elevated, expansive, or irritable mood with increased energy or activity - No; > 4 days - No,  >7 days - No; with inflated self-esteem or grandiosity - No, decreased need for sleep - No, increased rate of speech - No, FOI or racing thoughts - No, distractibility - No, increased goal directed activity or PMA - No, risky/disinhibited behavior - No     Symptoms of NATE: excessive anxiety/worry/fear, more days than not, about numerous issues - Yes, ongoing for >6 months - Yes, difficult to control - Yes, with restlessness - Yes, fatigue - Yes, poor concentration - Yes, irritability - Yes, muscle tension - Yes, sleep disturbance - No; causes functionally impairing distress - No        Symptoms of Panic Disorder: recurrent panic attacks (palpitations/heart racing, sweating, shakiness, dyspnea, choking, chest pain/discomfort, Gi symptoms, dizzy/lightheadedness, hot/col flashes, paresthesias, derealization, fear of losing control or fear of dying or fear of "going crazy") - No, precipitated - No, un-precipitated - No, source of worry and/or behavioral changes secondary for 1 month or longer- No, agoraphobia - No     Symptoms of PTSD: h/o trauma exposure - No; re-experiencing/intrusive symptoms - No, avoidant behavior - No, 2 or more negative alterations in cognition or mood - No, 2 or more hyperarousal symptoms - No; with dissociative symptoms - No, ongoing for 1 or more  months - No     Symptoms of OCD: obsessions (recurrent thoughts/urges/images; intrusive and/or unwanted; uses other thoughts/actions to suppress) - No; compulsions (repetitive behaviors used to lower distress/anxiety/obsessions) - No, time-consuming (over 1 hour per day) or cause significant distress/impairment - - No     Symptoms of Anorexia: restriction of caloric intake leading to significantly low body weight - No, intense fear of gaining weight or persistent behavior that interferes with weight gain even thought at a significantly low weight " - No, disturbance in the way in which one's body weight or shape is experienced, undue influence of body weight or shape on self evaluation, or persistent lack of recognition of the seriousness of the current low body weight - No     Symptoms of Bulimia: recurrent episodes of binge eating (definitely larger amount  than what others would eat and lack of a sense of control over eating during episode) - No, recurrent inappropriate compensatory behaviors in order to prevent weight gain (fasting, medications, exercise, vomiting) - No, binges and compensatory behaviors both occur on average at least once a week for 3 months - No, self evaluations is unduly influenced by body shape/weight- - No     Symptoms of Binge eating: recurrent episodes of binge eating (definitely larger amount than what others would eat and lack of a sense of control over eating during episode) - No, 3 or more of following (eating much more rapidly, eating until uncomfortably full, large amounts when not hungry, eating alone because of embarrassed by how much,  feeling disgusted with oneself, depressed or very guilty afterward) - No, distress regarding binges - No, binges occur on average at least once a week for 3 months - No        Substance/s:  Taken in larger amounts or over longer periods than intended: Yes,  Persistent desire or unsuccessful attempts to cut down or stop: Yes,  Great deal of time spent seeking, using or recovering from: Yes,  Craving or strong desire to use: Yes,  Recurrent use despite failure to meet major role obligation: Yes,  Continued use despite persistent or recurrent social/interparsonal issues due to use: Yes,  Important social/work/recreational activities given up due to use: Yes,  Recurrent use in physically hazardous situations: Yes,  Continued use despite knowledge of persistent physical or psychological problem: Yes,  Tolerance (either increased need or diminished effect): Yes,        Psychotherapy:  Target  symptoms: depression, anxiety , substance abuse  Why chosen therapy is appropriate versus another modality: relevant to diagnosis, patient responds to this modality, evidence based practice  Outcome monitoring methods: self-report, observation  Therapeutic intervention type: insight oriented psychotherapy, behavior modifying psychotherapy, supportive psychotherapy, interactive psychotherapy  Topics discussed/themes: building skills sets for symptom management, symptom recognition, substance abuse  The patient's response to the intervention is accepting. The patient's progress toward treatment goals is limited.   Duration of intervention: 16 minutes.        PAST PSYCHIATRIC HISTORY  Previous Psychiatric Hospitalizations: Yes- 5 to 12; first around late 30's; last at age 46- all for depression/addiction  Previous SI/HI: Yes,  Previous Suicide Attempts: Yes,   Previous Medication Trials: Yes,  Psychiatric Care (current & past): No,current; yes on past  History of Psychotherapy: No  History of Violence: Yes,  History of sexual/physical abuse: No,     PAST MEDICAL & SURGICAL HISTORY   No past medical history on file.  No past surgical history on file.     HTN     CURRENT PSYCH MEDICATION REGIMEN   none  Current Medication side effects:  n/a  Current Medication compliance:  n/a     Previous psych meds trials  Yes- unable to name     Home Meds:   Prior to Admission medications    Not on File            OTC Meds: none     Scheduled Meds:    nicotine  1 patch Transdermal Daily      PRN Meds: hydrOXYzine HCL, melatonin, OLANZapine **AND** OLANZapine   Psychotherapeutics (From admission, onward)        Start     Stop Route Frequency Ordered     10/23/23 0331   OLANZapine tablet 10 mg  (Olanzapine PRN (</= 64 yo))        See Hyperspace for full Linked Orders Report.    -- Oral Every 8 hours PRN 10/23/23 0331     10/23/23 0331   OLANZapine injection 10 mg  (Olanzapine PRN (</= 64 yo))        See Hyperspace for full Linked Orders  Report.    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 n/a  Confucianist:Spiritual without formal affiliation  Recreational activities: none     SUBSTANCE ABUSE HISTORY   Recreational Drugs: methamphetamines   Use of Alcohol: occasional, social use  Rehab History:yes   Tobacco Use:yes     LEGAL HISTORY:   Past charges/incarcerations: YES: drug related     Pending charges:NO     FAMILY PSYCHIATRIC HISTORY   No family history on file.     denied        ROS  General ROS: negative  Ophthalmic ROS: negative  ENT ROS: negative  Allergy and Immunology ROS: negative  Hematological and Lymphatic ROS: negative  Endocrine ROS: negative  Respiratory ROS: no cough, shortness of breath, or wheezing  Cardiovascular ROS: no chest pain or dyspnea on exertion  Gastrointestinal ROS: no abdominal pain, change in bowel habits, or black or bloody stools  Genito-Urinary ROS: no dysuria, trouble voiding, or hematuria  Musculoskeletal ROS: negative  Neurological ROS: no TIA or stroke symptoms  Dermatological ROS: negative            EXAMINATION     PHYSICAL EXAM  Reviewed note/exam by Dr. Shagufta MD from 10/22/23 at 8:38 PM; FM consulted for initial physical exam- pending      VITALS       Vitals:     10/23/23 0738   BP: 128/72   Pulse: 64   Resp: 18   Temp: 97.9 °F (36.6 °C)         Body mass index is 24.09 kg/m².           PAIN  0/10  Subjective report of pain matches objective signs and symptoms: Yes     LABORATORY DATA   Recent Results         Recent Results  (from the past 72 hour(s))   Urinalysis, Reflex to Urine Culture Urine, Clean Catch     Collection Time: 10/22/23  7:55 PM     Specimen: Urine   Result Value Ref Range     Specimen UA Urine, Clean Catch       Color, UA Colorless (A) Yellow, Straw, Tish     Appearance, UA Clear Clear     pH, UA 7.0 5.0 - 8.0     Specific Gravity, UA 1.010 1.005 - 1.030     Protein, UA Negative Negative     Glucose, UA Negative Negative     Ketones, UA Negative Negative     Bilirubin (UA) Negative Negative     Occult Blood UA Negative Negative     Nitrite, UA Negative Negative     Urobilinogen, UA Negative <2.0 EU/dL     Leukocytes, UA 2+ (A) Negative   Drug screen panel, emergency     Collection Time: 10/22/23  7:55 PM   Result Value Ref Range     Benzodiazepines Negative Negative     Methadone metabolites Negative Negative     Cocaine (Metab.) Negative Negative     Opiate Scrn, Ur Negative Negative     Barbiturate Screen, Ur Negative Negative     Amphetamine Screen, Ur Negative Negative     THC Negative Negative     Phencyclidine Negative Negative     Creatinine, Urine 56.5 23.0 - 375.0 mg/dL     Toxicology Information SEE COMMENT     Urinalysis Microscopic     Collection Time: 10/22/23  7:55 PM   Result Value Ref Range     RBC, UA 0 0 - 4 /hpf     WBC, UA 33 (H) 0 - 5 /hpf     WBC Clumps, UA Few (A) None-Rare     Bacteria Rare None-Occ /hpf     Microscopic Comment SEE COMMENT     COVID-19 Rapid Screening     Collection Time: 10/22/23  7:56 PM   Result Value Ref Range     SARS-CoV-2 RNA, Amplification, Qual Negative Negative   Hepatitis C Antibody     Collection Time: 10/22/23  8:14 PM   Result Value Ref Range     Hepatitis C Ab Negative Negative   HCV Virus Hold Specimen     Collection Time: 10/22/23  8:14 PM   Result Value Ref Range     HEP C Virus Hold Specimen Hold for HCV sendout     CBC auto differential     Collection Time: 10/22/23  8:14 PM   Result Value Ref Range     WBC 6.13 3.90 - 12.70 K/uL     RBC 5.34 4.60 - 6.20  M/uL     Hemoglobin 15.0 14.0 - 18.0 g/dL     Hematocrit 44.4 40.0 - 54.0 %     MCV 83 82 - 98 fL     MCH 28.1 27.0 - 31.0 pg     MCHC 33.8 32.0 - 36.0 g/dL     RDW 13.1 11.5 - 14.5 %     Platelets 254 150 - 450 K/uL     MPV 10.1 9.2 - 12.9 fL     Immature Granulocytes 0.3 0.0 - 0.5 %     Gran # (ANC) 3.2 1.8 - 7.7 K/uL     Immature Grans (Abs) 0.02 0.00 - 0.04 K/uL     Lymph # 2.2 1.0 - 4.8 K/uL     Mono # 0.5 0.3 - 1.0 K/uL     Eos # 0.2 0.0 - 0.5 K/uL     Baso # 0.05 0.00 - 0.20 K/uL     nRBC 0 0 /100 WBC     Gran % 51.5 38.0 - 73.0 %     Lymph % 36.5 18.0 - 48.0 %     Mono % 8.0 4.0 - 15.0 %     Eosinophil % 2.9 0.0 - 8.0 %     Basophil % 0.8 0.0 - 1.9 %     Differential Method Automated     Comprehensive metabolic panel     Collection Time: 10/22/23  8:14 PM   Result Value Ref Range     Sodium 140 136 - 145 mmol/L     Potassium 3.7 3.5 - 5.1 mmol/L     Chloride 103 95 - 110 mmol/L     CO2 25 23 - 29 mmol/L     Glucose 83 70 - 110 mg/dL     BUN 7 6 - 20 mg/dL     Creatinine 1.0 0.5 - 1.4 mg/dL     Calcium 9.0 8.7 - 10.5 mg/dL     Total Protein 6.8 6.0 - 8.4 g/dL     Albumin 3.9 3.5 - 5.2 g/dL     Total Bilirubin 0.3 0.1 - 1.0 mg/dL     Alkaline Phosphatase 153 (H) 55 - 135 U/L     AST 32 10 - 40 U/L     ALT 38 10 - 44 U/L     eGFR >60 >60 mL/min/1.73 m^2     Anion Gap 12 8 - 16 mmol/L   TSH     Collection Time: 10/22/23  8:14 PM   Result Value Ref Range     TSH 2.177 0.400 - 4.000 uIU/mL   Ethanol     Collection Time: 10/22/23  8:14 PM   Result Value Ref Range     Alcohol, Serum <10 <10 mg/dL   Acetaminophen level     Collection Time: 10/22/23  8:14 PM   Result Value Ref Range     Acetaminophen (Tylenol), Serum <3.0 (L) 10.0 - 20.0 ug/mL   Lipid panel     Collection Time: 10/23/23  6:02 AM   Result Value Ref Range     Cholesterol 174 120 - 199 mg/dL     Triglycerides 177 (H) 30 - 150 mg/dL     HDL 34 (L) 40 - 75 mg/dL     LDL Cholesterol 104.6 63.0 - 159.0 mg/dL     HDL/Cholesterol Ratio 19.5 (L) 20.0 - 50.0 %  "    Total Cholesterol/HDL Ratio 5.1 (H) 2.0 - 5.0     Non-HDL Cholesterol 140 mg/dL         No results found for: "PHENYTOIN", "PHENOBARB", "VALPROATE", "CBMZ"           CONSTITUTIONAL  General Appearance: unremarkable, age appropriate, older than stated age, disheveled     MUSCULOSKELETAL  Muscle Strength and Tone:no dyskinesia, no dystonia, no tremor, no tic  Abnormal Involuntary Movements: No  Gait and Station: non-ataxic     PSYCHIATRIC   Level of Consciousness: awake and alert   Orientation: person, place, time, and situation  Grooming: Casually dressed and Disheveled  Psychomotor Behavior: normal, cooperative, psychomotor retardation, eye contact normal  Speech: normal tone, normal rate, normal pitch, soft, spontaneous  Language: grossly intact, able to name, able to repeat  Mood: anxious and dysphoric  Affect: Anxious, Consistent with mood, Congruent with thought, and Constricted  Thought Process: linear, logical  Associations: intact   Thought Content: +SI, denies HI, and no delusions  Perceptions: denies AH and denies  VH  Memory: Able to recall past events, Remote intact, and Recent intact  Attention:Attends to interview without distraction  Fund of Knowledge: Aware of current events and Vocabulary appropriate   Estimate if Intelligence:  Average based on work/education history, vocabulary and mental status exam  Insight: intact, has awareness of illness- fair  Judgment: behavior is adequate to circumstances- fair        PSYCHOSOCIAL     PSYCHOSOCIAL STRESSORS   family, financial, occupational, and drug     FUNCTIONING RELATIONSHIPS   alone & isolated     STRENGTHS AND LIABILITIES   Strength: Patient accepts guidance/feedback, Strength: Patient is expressive/articulate., Liability: Patient is unstable., Liability: Patient lacks coping skills.     Is the patient aware of the biomedical complications associated with substance abuse and mental illness? yes     Does the patient have an Advance Directive for " Mental Health treatment? 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     Nicotine Dependence  Amphetamine use disorder, severe, continuous with physiological dependence      Psychosocial stressors        PROBLEM LIST AND MANAGEMENT PLANS     Depression: pt counseled  -start trial of Wellbutrin  mg po q day  -start adjunctive Zoloft at 25 mg po q day     Anxiety: pt counseled  -Zoloft as above  -vistaril prn     Insomnia: pt counseled  -vistaril prn     Nicotine Dependence: pt counseled  -start nicotine 14 mg patch dermal q day     Amphetamine use disorder: pt counseled  -rehab if willing     Psychosocial stressors: pt counseled  -SW consulted to assist with resources    PRESCRIPTION DRUG MANAGEMENT  Compliance: no  Side Effects: no  Regimen Adjustments: see above     Discussed diagnosis, risks and benefits of proposed treatment vs alternative treatments vs no treatment, potential side effects of these treatments and the inherent unpredictability of treatment. The patient expresses understanding of the above and displays the capacity to agree with this treatment given said understanding. Patient also agrees that, currently, the benefits outweigh the risks and would like to pursue/continue treatment at this time.     Any medications being used off-label were discussed with the patient inclusive of the evidence base for the use of the medications and consent was obtained for the off-label use of the medication.            DIAGNOSTIC TESTING  Labs reviewed with patient; follow up pending labs     Disposition:  -Will attempt to obtain outside psychiatric records if available  -SW to assist with aftercare planning and collateral  -Once stable discharge home with outpatient follow up care and/or rehab  -Continue inpatient treatment under a  "PEC and/or CEC for danger to self/ danger to others/grave disability as evident by danger to self, gravely disabled, and suicidal ideation           Austyn Méndez MD  Psychiatry                Present biopsychosocial functioning: Pt.is a 48 year old male whom presented to the ED for suicidal ideations. Per ED note pt.has been off medication for unknown time. Pt.reports staying clean for some time, then relapsed on methamphetamine and starting having visual hallucinations. Pt.stated" I think I overdosed and I went to [facility], they sent me home after cleaning my system out. When I made it home, I left and  I went to a motel and I came here" Pt.currently denies SI and HI. Pt.currently denies hallucinations and delusions. Pt.reports no social supports - and he would like to transition to rehab post discharge.     Past biopsychosocial functioning: Pt.reports previous inpatient psychiatric admissions.     Family and Marital/Relationship History:     Significant Other/Partner Relationships:  Single:      Family Relationships: Strained      Childhood History:     Where was patient raised[place]stated[place]"     Who raised the patient? Pt.stated"My parents"       How does patient describe their childhood? Pt.stated" It was alright, can't complain"       Who is patient's primary support person? Pt.stated" No one"       Culture and Adventism:     Adventism: Sabianism    How strong of a role does Hinduism and spirituality play in patient's life? Pt.stated" Not big, but I believe in God"    Religion or spiritual concerns regarding treatment: not applicable     History of Abuse:   History of Abuse: Denies      Outcome: Not Applicable    Psychiatric and Medical History:     History of psychiatric illness or treatment: prior inpatient treatment and prior suicide attempt(s)    Medical history: No past medical history on file.    Substance Abuse History:     Alcohol - (Patient Perspective):   Social History "     Substance and Sexual Activity   Alcohol Use Yes    Alcohol/week: 6.0 standard drinks of alcohol    Types: 6 Cans of beer per week       Alcohol - (Collateral Perspective): Unable to assess      Drugs - (Patient Perspective):   Social History     Substance and Sexual Activity   Drug Use Yes    Types: Methamphetamines       Drugs - (Collateral Perspective): Unable to assess      Additional Comments: None at this time     Education:     Currently Enrolled? No  High School (9-12) or GED    Special Education? No    Interested in Completing Education/GED: No    Employment and Financial:     Currently employed? Unemployed     Source of Income:  None    Able to afford basic needs (food, shelter, utilities)? No    Who manages finances/personal affairs? Patient does when he has income      Service:     [place]? no    Combat Service? No     Community Resources:     Describe present use of community resources: None      Identify previously used community resources   (Include previous mental health treatment - outpatient and inpatient): Pt.reports several prior inpatient hospitalizations; however, does not remember other inpatient facilities that he has been admitted to    Environmental:     Current living situation:Pt.reports to be homeless     Social Evaluation:     Patient Assets: General fund of knowledge    Patient Limitations: Homeless, Substance Use -pt.reports to use methamphetamine;however, UTOX was negative    High risk psychosocial issues that may impact discharge planning:   Homeless, Substance Use- pt.reports to use methamphetamine;however, UTOX was negative    Recommendations:     Anticipated discharge plan:   outpatient follow up    High risk issues requiring early treatment planning and immediate intervention: Homeless, Substance Use- pt.reports to use methamphetamine;however, UTOX was negative    Community resources needed for discharge planning:  housing, living arrangements, and aftercare treatment  sources    Anticipated social work role(s) in treatment and discharge planning: Will encourage pt.to attend psychotherapy groups, interacting and engaging in treatment interventions.

## 2023-10-24 NOTE — PLAN OF CARE
Closed, isolative within room, fair appetite, disheveled, no interaction with peers/staff, no participation within unit activities, redirects easily, cooperative with all requests, compliant with medication regime.1:1 to establish trusting rapport, redirect as needed, encouraged to verbalize thoughts and feelings to decrease depressive episodes, maintained on close obs checks q 15 min for increased safety.Plan to stabilize mood with medications, schedule follow up appointments for continued care and therapy, encourage inpatient rehab to help curb meth addiction and achieve sobriety.

## 2023-10-25 PROCEDURE — 25000003 PHARM REV CODE 250: Performed by: PSYCHIATRY & NEUROLOGY

## 2023-10-25 PROCEDURE — 90833 PR PSYCHOTHERAPY W/PATIENT W/E&M, 30 MIN (ADD ON): ICD-10-PCS | Mod: ,,, | Performed by: PSYCHIATRY & NEUROLOGY

## 2023-10-25 PROCEDURE — 11400000 HC PSYCH PRIVATE ROOM

## 2023-10-25 PROCEDURE — 99233 PR SUBSEQUENT HOSPITAL CARE,LEVL III: ICD-10-PCS | Mod: ,,, | Performed by: PSYCHIATRY & NEUROLOGY

## 2023-10-25 PROCEDURE — 99233 SBSQ HOSP IP/OBS HIGH 50: CPT | Mod: ,,, | Performed by: PSYCHIATRY & NEUROLOGY

## 2023-10-25 PROCEDURE — S4991 NICOTINE PATCH NONLEGEND: HCPCS | Performed by: PSYCHIATRY & NEUROLOGY

## 2023-10-25 PROCEDURE — 90833 PSYTX W PT W E/M 30 MIN: CPT | Mod: ,,, | Performed by: PSYCHIATRY & NEUROLOGY

## 2023-10-25 RX ADMIN — SERTRALINE HYDROCHLORIDE 50 MG: 50 TABLET ORAL at 08:10

## 2023-10-25 RX ADMIN — NICOTINE 1 PATCH: 14 PATCH, EXTENDED RELEASE TRANSDERMAL at 08:10

## 2023-10-25 RX ADMIN — Medication 6 MG: at 08:10

## 2023-10-25 RX ADMIN — BUPROPION HYDROCHLORIDE 150 MG: 150 TABLET, EXTENDED RELEASE ORAL at 08:10

## 2023-10-25 NOTE — PLAN OF CARE
POC ongoing. Depressed. Isolating in room- minimal interaction with staff or peers. Out for meals. Accepting meds and tolerating. Denies SI/HI/AH/VH.

## 2023-10-25 NOTE — PROGRESS NOTES
"PSYCHIATRY DAILY INPATIENT PROGRESS NOTE  SUBSEQUENT HOSPITAL VISIT    ENCOUNTER DATE: 10/25/2023  [facility]    DATE OF ADMISSION: 10/23/2023  3:23 AM  LENGTH OF STAY: 2 days      CHIEF COMPLAINT   Josh Tucker is a 48 y.o. male, seen during daily persaud rounds on the inpatient unit.  Josh Tucker presented with the chief complaint of  depression/addiction/SI, "I was having suicidal thoughts."      The patient was seen and examined. The chart was reviewed.     Reviewed notes from Rns, PA, MD, CTRS, RD, and SW and labs from the last 24 hours.    The patient's case was discussed with the treatment team/care providers today including Rns, Speciality services, and SW    Staff reports no behavioral or management issues.     The patient has been compliant with treatment.      Subjective 10/25/2023       Today the patient reports that he feels about the same, "depressed about what happened." He notes continued symptoms of depression/anxiety.    He discussed his long standing h/o substance use. Although his UDS was negative, he was just recently hospitalized for drug related overdose (reportedly within the last week).   -He appears to be having withdrawals which are complicating tx  -he would like rehab once stable    He discussed his life stressors and coping skills.      The patient denies any side effects to medications.              Psychiatric ROS (observed, reported, or endorsed/denied):  Depressed mood - Continuing  Interest/pleasure/anhedonia: Continuing  Guilt/hopelessness/worthlessness - Continuing  Changes in Sleep - variable  Changes in Appetite - variable  Changes in Concentration - variable  Changes in Energy - variable  PMA/R- Continuing  Suicidal- active/passive ideations - Continuing  Homicidal ideations: active/passive ideations - No    Hallucinations - No  Delusions - No  Disorganized behavior - No  Disorganized speech - No  Negative symptoms - No    Elevated mood - " No  Decreased need for sleep - No  Grandiosity - No  Racing thoughts - No  Impulsivity - No  Irritability- No  Increased energy - No  Distractibility - No  Increase in goal-directed activity or PMA- No    Symptoms of NATE - Continuing  Symptoms of Panic Disorder- No  Symptoms of PTSD - No        Overall progress: Patient is showing mild improvement         Psychotherapy:  Target symptoms: depression, anxiety , substance abuse  Why chosen therapy is appropriate versus another modality: relevant to diagnosis, patient responds to this modality, evidence based practice  Outcome monitoring methods: self-report, observation  Therapeutic intervention type: insight oriented psychotherapy, behavior modifying psychotherapy, supportive psychotherapy, interactive psychotherapy  Topics discussed/themes: building skills sets for symptom management, symptom recognition, substance abuse  The patient's response to the intervention is accepting. The patient's progress toward treatment goals is limited.   Duration of intervention: 16 minutes.        Medical ROS  General ROS: negative  Ophthalmic ROS: negative  ENT ROS: negative  Allergy and Immunology ROS: negative  Hematological and Lymphatic ROS: negative  Endocrine ROS: negative  Respiratory ROS: no cough, shortness of breath, or wheezing  Cardiovascular ROS: no chest pain or dyspnea on exertion  Gastrointestinal ROS: no abdominal pain, change in bowel habits, or black or bloody stools  Genito-Urinary ROS: no dysuria, trouble voiding, or hematuria  Musculoskeletal ROS: negative  Neurological ROS: no TIA or stroke symptoms  Dermatological ROS: negative      PAST MEDICAL HISTORY   No past medical history on file.        PSYCHOTROPIC MEDICATIONS   Scheduled Meds:   buPROPion  150 mg Oral Daily    nicotine  1 patch Transdermal Daily    sertraline  50 mg Oral Daily     Continuous Infusions:  PRN Meds:.hydrOXYzine HCL, melatonin, OLANZapine **AND** OLANZapine        EXAMINATION    VITALS    Vitals:    10/24/23 0715 10/24/23 1914 10/25/23 0746 10/25/23 0800   BP: 134/78 124/68 127/79    BP Location:   Left arm    Patient Position: Lying  Lying    Pulse: 72 72 76    Resp: 18 18 18    Temp: 97.9 °F (36.6 °C) 98.5 °F (36.9 °C) 98.8 °F (37.1 °C)    TempSrc: Temporal  Temporal    SpO2:       Weight:    75.8 kg (167 lb)   Height:           Body mass index is 23.96 kg/m².      CONSTITUTIONAL  General Appearance: unremarkable, age appropriate, older than stated age, disheveled     MUSCULOSKELETAL  Muscle Strength and Tone:no dyskinesia, no dystonia, no tremor, no tic  Abnormal Involuntary Movements: No  Gait and Station: non-ataxic     PSYCHIATRIC   Level of Consciousness: awake and alert   Orientation: person, place, time, and situation  Grooming: Casually dressed and Disheveled  Psychomotor Behavior: normal, cooperative, psychomotor retardation, eye contact normal  Speech: normal tone, normal rate, normal pitch, soft, spontaneous  Language: grossly intact, able to name, able to repeat  Mood: anxious and dysphoric  Affect: Anxious, Consistent with mood, Congruent with thought, and Constricted  Thought Process: linear, logical  Associations: intact   Thought Content: +SI, denies HI, and no delusions  Perceptions: denies AH and denies  VH  Memory: Able to recall past events, Remote intact, and Recent intact  Attention:Attends to interview without distraction  Fund of Knowledge: Aware of current events and Vocabulary appropriate   Estimate if Intelligence:  Average based on work/education history, vocabulary and mental status exam  Insight: intact, has awareness of illness- fair  Judgment: behavior is adequate to circumstances- fair    DIAGNOSTIC TESTING   Laboratory Results  No results found for this or any previous visit (from the past 24 hour(s)).          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     Nicotine Dependence  Amphetamine use disorder, severe, continuous with physiological dependence      Psychosocial stressors        PROBLEM LIST AND MANAGEMENT PLANS     Depression: pt counseled  -start trial of Wellbutrin  mg po q day- will increase to 300 mg on day 5 (on day 3)  -start adjunctive Zoloft at 25 mg po q day- increase to 50 mg po q day today (goal dose is 100-150 mg daily)     Anxiety: pt counseled  -Zoloft as above  -vistaril prn     Insomnia: pt counseled  -vistaril prn     Nicotine Dependence: pt counseled  -started/continue  nicotine 14 mg patch dermal q day     Amphetamine use disorder: pt counseled  -rehab if willing     Psychosocial stressors: pt counseled  -SW consulted to assist with resources          Discussed diagnosis, risks and benefits of proposed treatment vs alternative treatments vs no treatment, potential side effects of these treatments and the inherent unpredictability of treatment. The patient expresses understanding of the above and displays the capacity to agree with this treatment given said understanding. Patient also agrees that, currently, the benefits outweigh the risks and would like to pursue/continue treatment at this time.    Any medications being used off-label were discussed with the patient inclusive of the evidence base for the use of the medications and consent was obtained for the off-label use of the medication.       DISCHARGE PLANNING  Expected Disposition Plan: Home or Self Care/rehab      NEED FOR CONTINUED HOSPITALIZATION  Psychiatric illness continues to pose a potential threat to life or bodily function, of self or others, thereby requiring the need for continued inpatient psychiatric hospitalization: Yes, due to: danger to self, gravely disabled, and suicidal ideation, as evidenced by:  Ongoing concerns with SI. and Ongoing concerns with grave disability with patient unable to perform basic feeding, hygiene and dressing activities  without significant constant support.    Protective inpatient pyschiatric hospitalization required while a safe disposition plan is enacted: Yes    Patient stabilized and ready for discharge from inpatient psychiatric unit: No        STAFF:   Austyn Méndez MD  Psychiatry

## 2023-10-25 NOTE — PLAN OF CARE
Pt isolative, unkept, guarded, blunted.  Out for snacks only.  Minimal interaction.  Is cooperative.  No behavior problem.  No distress noted.  Took meds as ordered then went back to bed.

## 2023-10-25 NOTE — PROGRESS NOTES
"   10/25/23 1140   [facility] Group Therapy   Group Name Other  (1:1 attempt)   Specific Interventions Leisure Counseling   Participation Level Minimal   Participation Quality Lack of Interest   Insight/Motivation Other (see comments)   Affect/Mood Display Anxious;Depressed   Cognition Alert   Psychomotor Other (see comments)     Despite encouragement patient isolating in assigned room, in bed under the covers. Patient presents disheveled, anxious, depressed, reports "tired, alright" mood, lacks interest, accepted an alternate worksheet that focused on healthy habit forming.  "

## 2023-10-25 NOTE — PROGRESS NOTES
"   10/25/23 4100   [facility] Group Therapy   Group Name Other  (1:1)   Specific Interventions Coping Skills Training   Participation Level Appropriate;Sharing   Participation Quality Cooperative   Insight/Motivation Applies New Skills;Good   Affect/Mood Display Anxious   Cognition Alert   Psychomotor WNL     Patient presents anxious, cooperative, reports an "alright, better" mood, "I'm up moving around, thinking about going to rehab and what's next." Patient accepted an "I will cope with my A to Z skills list and identified acceptance, being open to new ideas and having patience as coping skills to use after discharge.  " Scalpel Size: 15 blade

## 2023-10-25 NOTE — PLAN OF CARE
Pt is sleeping at this time and has slept 7 hours uninterrupted.  NAD.  Resp even & unlabored.  Pathways clear.  Q 15 minute safety checks ongoing.  All precautions maintained

## 2023-10-26 PROCEDURE — 25000003 PHARM REV CODE 250: Performed by: PSYCHIATRY & NEUROLOGY

## 2023-10-26 PROCEDURE — 25000003 PHARM REV CODE 250: Performed by: STUDENT IN AN ORGANIZED HEALTH CARE EDUCATION/TRAINING PROGRAM

## 2023-10-26 PROCEDURE — 11400000 HC PSYCH PRIVATE ROOM

## 2023-10-26 PROCEDURE — 99232 PR SUBSEQUENT HOSPITAL CARE,LEVL II: ICD-10-PCS | Mod: ,,, | Performed by: STUDENT IN AN ORGANIZED HEALTH CARE EDUCATION/TRAINING PROGRAM

## 2023-10-26 PROCEDURE — 99232 SBSQ HOSP IP/OBS MODERATE 35: CPT | Mod: ,,, | Performed by: STUDENT IN AN ORGANIZED HEALTH CARE EDUCATION/TRAINING PROGRAM

## 2023-10-26 PROCEDURE — S4991 NICOTINE PATCH NONLEGEND: HCPCS | Performed by: PSYCHIATRY & NEUROLOGY

## 2023-10-26 RX ORDER — BUPROPION HYDROCHLORIDE 300 MG/1
300 TABLET ORAL DAILY
Status: DISCONTINUED | OUTPATIENT
Start: 2023-10-27 | End: 2023-11-02 | Stop reason: HOSPADM

## 2023-10-26 RX ORDER — TRAZODONE HYDROCHLORIDE 100 MG/1
100 TABLET ORAL NIGHTLY
Status: DISCONTINUED | OUTPATIENT
Start: 2023-10-26 | End: 2023-10-28

## 2023-10-26 RX ADMIN — Medication 6 MG: at 08:10

## 2023-10-26 RX ADMIN — NICOTINE 1 PATCH: 14 PATCH, EXTENDED RELEASE TRANSDERMAL at 08:10

## 2023-10-26 RX ADMIN — BUPROPION HYDROCHLORIDE 150 MG: 150 TABLET, EXTENDED RELEASE ORAL at 08:10

## 2023-10-26 RX ADMIN — SERTRALINE HYDROCHLORIDE 50 MG: 50 TABLET ORAL at 08:10

## 2023-10-26 RX ADMIN — TRAZODONE HYDROCHLORIDE 100 MG: 100 TABLET ORAL at 08:10

## 2023-10-26 RX ADMIN — HYDROXYZINE HYDROCHLORIDE 50 MG: 25 TABLET, FILM COATED ORAL at 08:10

## 2023-10-26 NOTE — PLAN OF CARE
Lying quietly in bed, eyes closed, respirations even, unlabored. Apparently asleep. Slept 8 hours thus far without interruption. Safety precautions maintained. Rounds done every 15 minutes. Bed is fixed in low position and room is uncluttered and pathways are clear.

## 2023-10-26 NOTE — MEDICAL/APP STUDENT
"PSYCHIATRY DAILY INPATIENT PROGRESS NOTE  SUBSEQUENT HOSPITAL VISIT    ENCOUNTER DATE: 10/26/2023  [facility]    DATE OF ADMISSION: 10/23/2023  3:23 AM  LENGTH OF STAY: 3 days      CHIEF COMPLAINT   Josh Tucker is a 48 y.o. male, seen during daily persaud rounds on the inpatient unit.  Josh Tucker presented with the chief complaint of  depression/addiction/SI      The patient was seen and examined. The chart was reviewed.     Reviewed notes from Rns and CTRS and labs from the last 24 hours.    The patient's case was discussed with the treatment team/care providers today including {staffnotes:38669}    Staff reports no behavioral or management issues.     The patient has been compliant with treatment.      Subjective 10/26/2023    Yesterday patient reported continued symptoms of depression/anxiety. Patient discussed h/o substance use and appeared to be having withdrawals which are complicating tx. Patient stated he would like rehab once stable.    Today the patient reports improved mood. Patient states that he wants to go to rehab but "doesn't want to go anywhere where I'm from." [place] Patient says that he is "getting anxious being here" as he is "not looking to start over again." He describes how he had a good job and a truck, and he just "messed up."  When asked about suicidal ideation, patient states "I want to live." Patient stated that he doesn't know if the medication has made any difference for him and said, "I'm probably not going to take this medicine when I leave." Informed patient of importance of continuing medication.    The patient denies any side effects to medications.        Interim/overnight events per report/notes:    Patient slept 8 hours overnight without interruption. Last night, patient denied intent to harm self or others. Denied hallucinations or delusions. Patient stated he is hoping to go to rehab. "       Psychiatric ROS (observed, reported, or endorsed/denied):  Depressed mood - improving steadily  Interest/pleasure/anhedonia: No  Guilt/hopelessness/worthlessness - No  Changes in Sleep - No  Changes in Appetite - No  Changes in Concentration - No  Changes in Energy - No  PMA/R- No  Suicidal- active/passive ideations - No  Homicidal ideations: active/passive ideations - No    Hallucinations - No  Delusions - No  Disorganized behavior - No  Disorganized speech - No  Negative symptoms - No    Elevated mood - No  Decreased need for sleep - No  Grandiosity - No  Racing thoughts - No  Impulsivity - No  Irritability- No  Increased energy - No  Distractibility - No  Increase in goal-directed activity or PMA- No    Symptoms of NATE - Continuing  Symptoms of Panic Disorder- No  Symptoms of PTSD - No        Overall progress: Patient is showing mild improvement         Psychotherapy:  Target symptoms: {PSY TARGET SYMPTOMS:75401}  Why chosen therapy is appropriate versus another modality: {reason:98127}  Outcome monitoring methods: {methods:30185}  Therapeutic intervention type: {types:04304}  Topics discussed/themes: {Topics:20403}  The patient's response to the intervention is {PSY INTERVENTION RESPONSE:47030}. The patient's progress toward treatment goals is {Progress:20262}.   Duration of intervention: *** minutes.        Medical ROS  General ROS: negative      PAST MEDICAL HISTORY   No past medical history on file.        PSYCHOTROPIC MEDICATIONS   Scheduled Meds:   buPROPion  150 mg Oral Daily    nicotine  1 patch Transdermal Daily    sertraline  50 mg Oral Daily     Continuous Infusions:  PRN Meds:.hydrOXYzine HCL, melatonin, OLANZapine **AND** OLANZapine        EXAMINATION    VITALS   Vitals:    10/25/23 0746 10/25/23 0800 10/25/23 1934 10/26/23 0744   BP: 127/79  133/87 125/68   BP Location: Left arm  Left arm Left arm   Patient Position: Lying  Sitting Lying   Pulse: 76  73 71   Resp: 18  18 18   Temp: 98.8 °F  (37.1 °C)  97.8 °F (36.6 °C) 98.3 °F (36.8 °C)   TempSrc: Temporal  Temporal Temporal   SpO2:       Weight:  75.8 kg (167 lb)     Height:           Body mass index is 23.96 kg/m².        CONSTITUTIONAL  General Appearance: unremarkable, age appropriate, older than stated age, disheveled    MUSCULOSKELETAL  Muscle Strength and Tone:no dyskinesia, no tremor, no tic  Abnormal Involuntary Movements: No  Gait and Station: non-ataxic    PSYCHIATRIC   Level of Consciousness: awake and alert   Orientation: person, place, time, and situation  Grooming: Casually dressed and Disheveled  Psychomotor Behavior: normal, cooperative, psychomotor retardation, eye contact normal  Speech: normal tone, normal rate, normal pitch, normal volume, spontaneous  Language: grossly intact, able to name, able to repeat  Mood: anxious and dysphoric  Affect: Anxious, Consistent with mood, and Congruent with thought  Thought Process: linear, logical  Associations: intact   Thought Content: denies SI, denies HI, and no delusions  Perceptions: denies AH and denies  VH  Memory: Able to recall past events, Remote intact, and Recent intact  Attention:Attends to interview without distraction  Fund of Knowledge: Aware of current events and Vocabulary appropriate   Estimate if Intelligence:  Average based on work/education history, vocabulary and mental status exam  Insight: intact, has awareness of illness  Judgment: behavior is adequate to circumstances        DIAGNOSTIC TESTING   Laboratory Results  No results found for this or any previous visit (from the past 24 hour(s)).          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    Nicotine Dependence  Amphetamine use disorder, severe, continuous with physiological dependence    Psychosocial stressors    PROBLEM LIST AND MANAGEMENT PLANS    Depression:   - continue Wellbutrin  mg po q  day - will increase to 300 mg on day 5 (on day 4)  - continue adjunctive Zoloft at 50 mg po q day (goal dose is 100-150 mg daily)    Anxiety:  - Zoloft as above  - vistaril prn    Insomnia:  - Vistaril prn    Nicotine Dependence:  - continue nicotine 14 mg patch dermal q day    Amphetamine use disorder:  - rehab if willing    Psychosocial stressors:  - SW consulted to assist with resources        Discussed diagnosis, risks and benefits of proposed treatment vs alternative treatments vs no treatment, potential side effects of these treatments and the inherent unpredictability of treatment. The patient expresses understanding of the above and displays the capacity to agree with this treatment given said understanding. Patient also agrees that, currently, the benefits outweigh the risks and would like to pursue/continue treatment at this time.    Any medications being used off-label were discussed with the patient inclusive of the evidence base for the use of the medications and consent was obtained for the off-label use of the medication.       DISCHARGE PLANNING  Expected Disposition Plan: Home or Self Care/rehab      NEED FOR CONTINUED HOSPITALIZATION  Psychiatric illness continues to pose a potential threat to life or bodily function, of self or others, thereby requiring the need for continued inpatient psychiatric hospitalization: Yes, due to: danger to self and gravely disabled, as evidenced by:  Ongoing concerns with grave disability with patient unable to perform basic feeding, hygiene and dressing activities without significant constant support.    Protective inpatient pyschiatric hospitalization required while a safe disposition plan is enacted: Yes    Patient stabilized and ready for discharge from inpatient psychiatric unit: No        STAFF:     Psychiatry

## 2023-10-26 NOTE — PLAN OF CARE
Pt accepting of all medications and meals.   Pt mainly isolates in room.  Pt denies suicidal ideations, no self harming behavior noted.   Instructed to call for any new needs.

## 2023-10-26 NOTE — PROGRESS NOTES
"   10/26/23 1171   [facility] Group Therapy   Group Name Leisure Skills Training   Specific Interventions Leisure Counseling  (leisure skills crossword puzzle)   Participation Level Appropriate;Sharing;Attentive   Participation Quality Cooperative   Insight/Motivation Applies New Skills;Good   Affect/Mood Display Anxious   Cognition Alert   Psychomotor WNL     Patient presents anxious, cooperative, reports a better mood, read items explaining leisure and shared his favorite activities are playing sports, playing pool and listening to the radio and benefits are "it takes my mind off things and help me relax. Patient encouraged to monitor his environment to help keep a healthy lifestyle.  "

## 2023-10-26 NOTE — PROGRESS NOTES
"PSYCHIATRY DAILY INPATIENT PROGRESS NOTE  SUBSEQUENT HOSPITAL VISIT    ENCOUNTER DATE: 10/26/2023  [facility]    DATE OF ADMISSION: 10/23/2023  3:23 AM  LENGTH OF STAY: 3 days      CHIEF COMPLAINT   Josh Tucker is a 48 y.o. male, seen during daily persaud rounds on the inpatient unit.  Josh Tucker presented with the chief complaint of  depression/addiction/SI, "I was having suicidal thoughts."      The patient was seen and examined. The chart was reviewed.     Reviewed notes from Rns, MD, and CTRS and labs from the last 24 hours.    The patient's case was discussed with the treatment team/care providers today including Rns    Staff reports no behavioral or management issues.     The patient has been compliant with treatment.      Subjective 10/26/2023       Today the patient reports "I'm not sleeping well."    He notes continued symptoms of depression/anxiety.    He discussed his long standing h/o substance use.   Although his UDS was negative, he was just recently hospitalized for drug related overdose (reportedly within the last week).     -He appears to be having withdrawals which are complicating tx  -he would like rehab once stable    The patient denies any side effects to medications.      Psychiatric ROS (observed, reported, or endorsed/denied):  Depressed mood - fluctuating  Interest/pleasure/anhedonia: Continuing  Guilt/hopelessness/worthlessness - Continuing  Changes in Sleep - variable  Changes in Appetite - variable  Changes in Concentration - variable  Changes in Energy - variable  PMA/R- Continuing  Suicidal- active/passive ideations - less  Homicidal ideations: active/passive ideations - No    Hallucinations - No  Delusions - No  Disorganized behavior - No  Disorganized speech - No  Negative symptoms - No    Elevated mood - No  Decreased need for sleep - No  Grandiosity - No  Racing thoughts - No  Impulsivity - No  Irritability- No  Increased energy - No  Distractibility - " No  Increase in goal-directed activity or PMA- No    Symptoms of NATE - Continuing  Symptoms of Panic Disorder- No  Symptoms of PTSD - No        Overall progress: Patient is showing mild improvement             Medical ROS  General ROS: negative  Ophthalmic ROS: negative  ENT ROS: negative  Allergy and Immunology ROS: negative  Hematological and Lymphatic ROS: negative  Endocrine ROS: negative  Respiratory ROS: no cough, shortness of breath, or wheezing  Cardiovascular ROS: no chest pain or dyspnea on exertion  Gastrointestinal ROS: no abdominal pain, change in bowel habits, or black or bloody stools  Genito-Urinary ROS: no dysuria, trouble voiding, or hematuria  Musculoskeletal ROS: negative  Neurological ROS: no TIA or stroke symptoms  Dermatological ROS: negative      PAST MEDICAL HISTORY   No past medical history on file.        PSYCHOTROPIC MEDICATIONS   Scheduled Meds:   buPROPion  150 mg Oral Daily    nicotine  1 patch Transdermal Daily    sertraline  50 mg Oral Daily     Continuous Infusions:  PRN Meds:.hydrOXYzine HCL, melatonin, OLANZapine **AND** OLANZapine        EXAMINATION    VITALS   Vitals:    10/25/23 0746 10/25/23 0800 10/25/23 1934 10/26/23 0744   BP: 127/79  133/87 125/68   BP Location: Left arm  Left arm Left arm   Patient Position: Lying  Sitting Lying   Pulse: 76  73 71   Resp: 18  18 18   Temp: 98.8 °F (37.1 °C)  97.8 °F (36.6 °C) 98.3 °F (36.8 °C)   TempSrc: Temporal  Temporal Temporal   SpO2:       Weight:  75.8 kg (167 lb)     Height:           Body mass index is 23.96 kg/m².      CONSTITUTIONAL  General Appearance: unremarkable, age appropriate, older than stated age, disheveled     MUSCULOSKELETAL  Muscle Strength and Tone:no dyskinesia, no dystonia, no tremor, no tic  Abnormal Involuntary Movements: No  Gait and Station: non-ataxic     PSYCHIATRIC   Level of Consciousness: awake and alert   Orientation: person, place, time, and situation  Grooming: Casually dressed and  Disheveled  Psychomotor Behavior: normal, cooperative, psychomotor retardation, eye contact normal  Speech: normal tone, normal rate, normal pitch, soft, spontaneous  Language: grossly intact, able to name, able to repeat  Mood: anxious   Affect: Anxious, Consistent with mood, Congruent with thought, and Constricted  Thought Process: linear, logical  Associations: intact   Thought Content: less SI, denies HI, and no delusions  Perceptions: denies AH and denies  VH  Memory: Able to recall past events, Remote intact, and Recent intact  Attention:Attends to interview without distraction  Fund of Knowledge: Aware of current events and Vocabulary appropriate   Estimate if Intelligence:  Average based on work/education history, vocabulary and mental status exam  Insight: intact, has awareness of illness- fair  Judgment: behavior is adequate to circumstances- fair    DIAGNOSTIC TESTING   Laboratory Results  No results found for this or any previous visit (from the past 24 hour(s)).          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     Nicotine Dependence  Amphetamine use disorder, severe, continuous with physiological dependence      Psychosocial stressors        PROBLEM LIST AND MANAGEMENT PLANS     Depression: pt counseled  -start trial of Wellbutrin  mg po q day- will increase to 300 mg on day 5 (on day 4)  -start adjunctive Zoloft at 25 mg po q day- increase to 50 mg po q day (goal dose is 100-150 mg daily)  - start trazodone 100 mg PO qhs tonight     Anxiety: pt counseled  -Zoloft as above  -vistaril prn     Insomnia: pt counseled  -vistaril prn     Nicotine Dependence: pt counseled  -started/continue  nicotine 14 mg patch dermal q day     Amphetamine use disorder: pt counseled  -rehab if willing     Psychosocial stressors: pt counseled  -SW consulted to assist with  resources          Discussed diagnosis, risks and benefits of proposed treatment vs alternative treatments vs no treatment, potential side effects of these treatments and the inherent unpredictability of treatment. The patient expresses understanding of the above and displays the capacity to agree with this treatment given said understanding. Patient also agrees that, currently, the benefits outweigh the risks and would like to pursue/continue treatment at this time.    Any medications being used off-label were discussed with the patient inclusive of the evidence base for the use of the medications and consent was obtained for the off-label use of the medication.       DISCHARGE PLANNING  Expected Disposition Plan: Home or Self Care/rehab      NEED FOR CONTINUED HOSPITALIZATION  Psychiatric illness continues to pose a potential threat to life or bodily function, of self or others, thereby requiring the need for continued inpatient psychiatric hospitalization: Yes, due to: danger to self, gravely disabled, and suicidal ideation, as evidenced by:  Ongoing concerns with SI. and Ongoing concerns with grave disability with patient unable to perform basic feeding, hygiene and dressing activities without significant constant support.    Protective inpatient pyschiatric hospitalization required while a safe disposition plan is enacted: Yes    Patient stabilized and ready for discharge from inpatient psychiatric unit: No        STAFF:   Bao Bear III, MD  Psychiatry

## 2023-10-26 NOTE — PLAN OF CARE
Plan of care reviewed. Denies intent to harm self or others at this time. Denies hallucinations and  delusions. Accepts snacks and medications. Gait steady, no falls. Limited interaction noted with staff and peers.  States he is hoping to go to rehab. States went to rehab in the past and was clean for 2 years. Patient is quiet, cooperative, and calm. Will continue precautions and monitor for safety.

## 2023-10-27 PROCEDURE — S4991 NICOTINE PATCH NONLEGEND: HCPCS | Performed by: PSYCHIATRY & NEUROLOGY

## 2023-10-27 PROCEDURE — 11400000 HC PSYCH PRIVATE ROOM

## 2023-10-27 PROCEDURE — 99233 PR SUBSEQUENT HOSPITAL CARE,LEVL III: ICD-10-PCS | Mod: ,,, | Performed by: STUDENT IN AN ORGANIZED HEALTH CARE EDUCATION/TRAINING PROGRAM

## 2023-10-27 PROCEDURE — 25000003 PHARM REV CODE 250: Performed by: PSYCHIATRY & NEUROLOGY

## 2023-10-27 PROCEDURE — 25000003 PHARM REV CODE 250: Performed by: STUDENT IN AN ORGANIZED HEALTH CARE EDUCATION/TRAINING PROGRAM

## 2023-10-27 PROCEDURE — 99233 SBSQ HOSP IP/OBS HIGH 50: CPT | Mod: ,,, | Performed by: STUDENT IN AN ORGANIZED HEALTH CARE EDUCATION/TRAINING PROGRAM

## 2023-10-27 RX ADMIN — SERTRALINE HYDROCHLORIDE 50 MG: 50 TABLET ORAL at 08:10

## 2023-10-27 RX ADMIN — Medication 6 MG: at 08:10

## 2023-10-27 RX ADMIN — NICOTINE 1 PATCH: 14 PATCH, EXTENDED RELEASE TRANSDERMAL at 08:10

## 2023-10-27 RX ADMIN — HYDROXYZINE HYDROCHLORIDE 50 MG: 25 TABLET, FILM COATED ORAL at 08:10

## 2023-10-27 RX ADMIN — BUPROPION HYDROCHLORIDE 300 MG: 300 TABLET, FILM COATED, EXTENDED RELEASE ORAL at 08:10

## 2023-10-27 RX ADMIN — TRAZODONE HYDROCHLORIDE 100 MG: 100 TABLET ORAL at 08:10

## 2023-10-27 NOTE — PLAN OF CARE
Patient guarded and withdrawn. Patient disheveled and unshaved. Encouraged this patient to increase in daily ADL's. Patient blunted affect with depressed mood. Patient reports on a scale of 1-10 with ten being the highest level of depression. Patient rating depression today a 8/10. On the same scale patient rating anxiety a 6/10. Encouraged patient to attend groups and activities.  Patient denies SI/HI no self harming behavior displayed, no aggressive behavior displayed towards others. Patient contracted safety with staff and unit.  Educated this patient on the CBT packet and a Coping skill packet. Discussed the packet and educated this patient on the importance of gaining coping skills and education to help reach soberity. Educated, reviewed  and discussed plan of care and medication regiment with this patient. Discussed and educated with this patient any concerns that needed to be addressed along with the importance of medication compliance  1:1 with this patient. Patient voices understanding of all teachings.

## 2023-10-27 NOTE — PROGRESS NOTES
"   10/27/23 1010   [facility] Group Therapy   Group Name Therapeutic Recreation   Specific Interventions Cognitive Stimulation Training  (to improve concentration, learning skills and problem solving skills)   Participation Level Appropriate;Attentive   Participation Quality Cooperative;Social   Insight/Motivation Applies New Skills;Good   Affect/Mood Display Anxious   Cognition Alert   Psychomotor WNL     Patient presents anxious, disheveled, reports his mind is clearer, "I'm alright. I'm getting better." Patient shows interest and initial ability to follow directions, remained alert, involved and enjoyed the activity.  "

## 2023-10-27 NOTE — PLAN OF CARE
Behavior:     Pt attended group dressed in hospital scrubs. Pt attentive and engaged throughout session.            Intervention:  Process Group: Discussion on how each patient is feeling today and to focus on where they are in their mental health journey versus where they would like to be and what support behavioral health staff can offer to assist.            Response:  Pt shared he returned to use after two years of sobriety. Pt focused on making decisions about where he has been living as the rent is coming due so he may not be able to return there. Pt weighing options to either go to residential care or intensive outpatient.   Pt affect cooperative with stable mood at present.      Plan:  Continue treatment plan to stabilize symptoms. Pt to meet with discharge planner to review treatment options and resources.

## 2023-10-27 NOTE — PROGRESS NOTES
"PSYCHIATRY DAILY INPATIENT PROGRESS NOTE  SUBSEQUENT HOSPITAL VISIT    ENCOUNTER DATE: 10/27/2023  [facility]    DATE OF ADMISSION: 10/23/2023  3:23 AM  LENGTH OF STAY: 4 days      CHIEF COMPLAINT   Josh Tucker is a 48 y.o. male, seen during daily persaud rounds on the inpatient unit.  Josh Tucker presented with the chief complaint of  depression/addiction/SI, "I was having suicidal thoughts."      The patient was seen and examined. The chart was reviewed.     Reviewed notes from Rns and SW and labs from the last 24 hours.    The patient's case was discussed with the treatment team/care providers today including Rns    Staff reports no behavioral or management issues.     The patient has been compliant with treatment.      Subjective 10/27/2023       Today the patient reports "I am here for a purpose."    He notes continued symptoms of depression/anxiety.    He discussed his long standing h/o substance use.   Although his UDS was negative, he was just recently hospitalized for drug related overdose (reportedly within the last week).     -He appears to be having withdrawals which are complicating tx    -he would like rehab once stable    The patient denies any side effects to medications.      Psychiatric ROS (observed, reported, or endorsed/denied):  Depressed mood - less  Interest/pleasure/anhedonia: less  Guilt/hopelessness/worthlessness - less  Changes in Sleep - variable  Changes in Appetite - less  Changes in Concentration - less  Changes in Energy - less  PMA/R- Continuing  Suicidal- active/passive ideations - less  Homicidal ideations: active/passive ideations - No    Hallucinations - No  Delusions - No  Disorganized behavior - No  Disorganized speech - No  Negative symptoms - No    Elevated mood - No  Decreased need for sleep - No  Grandiosity - No  Racing thoughts - No  Impulsivity - No  Irritability- No  Increased energy - No  Distractibility - No  Increase in goal-directed " activity or PMA- No    Symptoms of NATE - Continuing  Symptoms of Panic Disorder- No  Symptoms of PTSD - No        Overall progress: Patient is showing mild improvement             Medical ROS  General ROS: negative  Ophthalmic ROS: negative  ENT ROS: negative  Allergy and Immunology ROS: negative  Hematological and Lymphatic ROS: negative  Endocrine ROS: negative  Respiratory ROS: no cough, shortness of breath, or wheezing  Cardiovascular ROS: no chest pain or dyspnea on exertion  Gastrointestinal ROS: no abdominal pain, change in bowel habits, or black or bloody stools  Genito-Urinary ROS: no dysuria, trouble voiding, or hematuria  Musculoskeletal ROS: negative  Neurological ROS: no TIA or stroke symptoms  Dermatological ROS: negative      PAST MEDICAL HISTORY   No past medical history on file.        PSYCHOTROPIC MEDICATIONS   Scheduled Meds:   buPROPion  300 mg Oral Daily    nicotine  1 patch Transdermal Daily    sertraline  50 mg Oral Daily    traZODone  100 mg Oral QHS     Continuous Infusions:  PRN Meds:.hydrOXYzine HCL, melatonin, OLANZapine **AND** OLANZapine        EXAMINATION    VITALS   Vitals:    10/25/23 1934 10/26/23 0744 10/26/23 1946 10/27/23 0730   BP: 133/87 125/68 134/82 114/66   BP Location: Left arm Left arm Right arm    Patient Position: Sitting Lying Sitting    Pulse: 73 71 84 78   Resp: 18 18 18 16   Temp: 97.8 °F (36.6 °C) 98.3 °F (36.8 °C) 97.1 °F (36.2 °C) 97.9 °F (36.6 °C)   TempSrc: Temporal Temporal Temporal Temporal   SpO2:       Weight:       Height:           Body mass index is 23.96 kg/m².      CONSTITUTIONAL  General Appearance: unremarkable, age appropriate, older than stated age, disheveled     MUSCULOSKELETAL  Muscle Strength and Tone:no dyskinesia, no dystonia, no tremor, no tic  Abnormal Involuntary Movements: No  Gait and Station: non-ataxic     PSYCHIATRIC   Level of Consciousness: awake and alert   Orientation: person, place, time, and situation  Grooming: Casually dressed  and Disheveled  Psychomotor Behavior: normal, cooperative, psychomotor retardation, eye contact normal  Speech: normal tone, normal rate, normal pitch, soft, spontaneous  Language: grossly intact, able to name, able to repeat  Mood: anxious   Affect: Anxious, Consistent with mood, Congruent with thought, and Constricted  Thought Process: linear, logical  Associations: intact   Thought Content: less SI, denies HI, and no delusions  Perceptions: denies AH and denies  VH  Memory: Able to recall past events, Remote intact, and Recent intact  Attention:Attends to interview without distraction  Fund of Knowledge: Aware of current events and Vocabulary appropriate   Estimate if Intelligence:  Average based on work/education history, vocabulary and mental status exam  Insight: intact, has awareness of illness- fair  Judgment: behavior is adequate to circumstances- fair    DIAGNOSTIC TESTING   Laboratory Results  No results found for this or any previous visit (from the past 24 hour(s)).          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     Nicotine Dependence  Amphetamine use disorder, severe, continuous with physiological dependence      Psychosocial stressors        PROBLEM LIST AND MANAGEMENT PLANS     Depression: pt counseled  -start trial of Wellbutrin  mg po q day- will increase to 300 mg today  -start adjunctive Zoloft at 25 mg po q day- increase to 50 mg po q day  -increase to 75 mg PO qd tomorrow  - continue trazodone 100 mg PO qhs      Anxiety: pt counseled  -Zoloft as above  -vistaril prn     Insomnia: pt counseled  -vistaril prn     Nicotine Dependence: pt counseled  -started/continue  nicotine 14 mg patch dermal q day     Amphetamine use disorder: pt counseled  -rehab if willing     Psychosocial stressors: pt counseled  -SW consulted to assist with resources          Discussed  diagnosis, risks and benefits of proposed treatment vs alternative treatments vs no treatment, potential side effects of these treatments and the inherent unpredictability of treatment. The patient expresses understanding of the above and displays the capacity to agree with this treatment given said understanding. Patient also agrees that, currently, the benefits outweigh the risks and would like to pursue/continue treatment at this time.    Any medications being used off-label were discussed with the patient inclusive of the evidence base for the use of the medications and consent was obtained for the off-label use of the medication.       DISCHARGE PLANNING  Expected Disposition Plan: Home or Self Care/rehab      NEED FOR CONTINUED HOSPITALIZATION  Psychiatric illness continues to pose a potential threat to life or bodily function, of self or others, thereby requiring the need for continued inpatient psychiatric hospitalization: Yes, due to: danger to self, gravely disabled, and suicidal ideation, as evidenced by:  Ongoing concerns with SI. and Ongoing concerns with grave disability with patient unable to perform basic feeding, hygiene and dressing activities without significant constant support.    Protective inpatient pyschiatric hospitalization required while a safe disposition plan is enacted: Yes    Patient stabilized and ready for discharge from inpatient psychiatric unit: No        STAFF:   Bao Bear III, MD  Psychiatry

## 2023-10-27 NOTE — PLAN OF CARE
Pt observed in bed all of shift, hypoactive and isolative. Pt denies S/I at this time. Pt is compliant with plan of care. Pt appeared to be asleep 8.5 hours, respirations even and unlabored. Rounds completed every 15 minutes. NAD noted or further needs voiced.     Problem: Adult Behavioral Health Plan of Care  Goal: Patient-Specific Goal (Individualization)  Outcome: Ongoing, Progressing     Problem: Suicide Risk  Goal: Absence of Self-Harm  Outcome: Ongoing, Progressing     Problem: Mood Impairment (Depressive Signs/Symptoms)  Goal: Improved Mood Symptoms (Depressive Signs/Symptoms)  Outcome: Ongoing, Progressing

## 2023-10-27 NOTE — PROGRESS NOTES
10/27/23 1608   [facility] Group Therapy   Group Name Other  (1:1 attempt)   Participation Level None   Participation Quality Sleeping     Patient resting in bed with her eyes closed and did not participate.

## 2023-10-27 NOTE — PLAN OF CARE
Pt avoiding social contact, isolating to room, denies SI at this time, up for meals and snacks, med compliant, safety precautions maintained, will continue to monitor

## 2023-10-28 PROCEDURE — 25000003 PHARM REV CODE 250: Performed by: PSYCHIATRY & NEUROLOGY

## 2023-10-28 PROCEDURE — S4991 NICOTINE PATCH NONLEGEND: HCPCS | Performed by: PSYCHIATRY & NEUROLOGY

## 2023-10-28 PROCEDURE — 99232 SBSQ HOSP IP/OBS MODERATE 35: CPT | Mod: ,,, | Performed by: PSYCHIATRY & NEUROLOGY

## 2023-10-28 PROCEDURE — 11400000 HC PSYCH PRIVATE ROOM

## 2023-10-28 PROCEDURE — 99232 PR SUBSEQUENT HOSPITAL CARE,LEVL II: ICD-10-PCS | Mod: ,,, | Performed by: PSYCHIATRY & NEUROLOGY

## 2023-10-28 PROCEDURE — 25000003 PHARM REV CODE 250: Performed by: STUDENT IN AN ORGANIZED HEALTH CARE EDUCATION/TRAINING PROGRAM

## 2023-10-28 RX ORDER — TRAZODONE HYDROCHLORIDE 100 MG/1
100 TABLET ORAL NIGHTLY
Status: DISCONTINUED | OUTPATIENT
Start: 2023-10-28 | End: 2023-10-28

## 2023-10-28 RX ORDER — TRAZODONE HYDROCHLORIDE 150 MG/1
150 TABLET ORAL NIGHTLY
Status: DISCONTINUED | OUTPATIENT
Start: 2023-10-28 | End: 2023-10-30

## 2023-10-28 RX ORDER — TRAZODONE HYDROCHLORIDE 150 MG/1
150 TABLET ORAL NIGHTLY
Status: DISCONTINUED | OUTPATIENT
Start: 2023-10-28 | End: 2023-10-28

## 2023-10-28 RX ADMIN — NICOTINE 1 PATCH: 14 PATCH, EXTENDED RELEASE TRANSDERMAL at 09:10

## 2023-10-28 RX ADMIN — SERTRALINE HYDROCHLORIDE 75 MG: 50 TABLET ORAL at 08:10

## 2023-10-28 RX ADMIN — TRAZODONE HYDROCHLORIDE 150 MG: 150 TABLET ORAL at 08:10

## 2023-10-28 RX ADMIN — Medication 6 MG: at 08:10

## 2023-10-28 RX ADMIN — BUPROPION HYDROCHLORIDE 300 MG: 300 TABLET, FILM COATED, EXTENDED RELEASE ORAL at 08:10

## 2023-10-28 RX ADMIN — HYDROXYZINE HYDROCHLORIDE 50 MG: 25 TABLET, FILM COATED ORAL at 08:10

## 2023-10-28 NOTE — PROGRESS NOTES
"PSYCHIATRY DAILY INPATIENT PROGRESS NOTE  SUBSEQUENT HOSPITAL VISIT    ENCOUNTER DATE: 10/28/2023  [facility]    DATE OF ADMISSION: 10/23/2023  3:23 AM  LENGTH OF STAY: 5 days      CHIEF COMPLAINT   Josh Tucker is a 48 y.o. male, seen during daily persaud rounds on the inpatient unit.  Josh Tucker presented with the chief complaint of  depression/addiction/SI, "I was having suicidal thoughts."      The patient was seen and examined. The chart was reviewed.     Reviewed notes from Rns and SW and labs from the last 24 hours.    The patient's case was discussed with the treatment team/care providers today including Rns    Staff reports no behavioral or management issues.     The patient has been compliant with treatment.      Subjective 10/28/2023       Today the patient reports ongoing improvement in mood, though he continues to experience difficulty falling asleep and staying asleep.  He is amenable to titration of trazodone for this.     He notes continued symptoms of depression/anxiety.    He discussed his long standing h/o substance use.   Although his UDS was negative, he was just recently hospitalized for drug related overdose (reportedly within the last week).     -He appears to be having withdrawals which are complicating tx    -he would like rehab once stable    The patient denies any side effects to medications.      Psychiatric ROS (observed, reported, or endorsed/denied):  Depressed mood - less  Interest/pleasure/anhedonia: less  Guilt/hopelessness/worthlessness - less  Changes in Sleep - variable  Changes in Appetite - less  Changes in Concentration - less  Changes in Energy - less  PMA/R- Continuing  Suicidal- active/passive ideations - less  Homicidal ideations: active/passive ideations - No    Hallucinations - No  Delusions - No  Disorganized behavior - No  Disorganized speech - No  Negative symptoms - No    Elevated mood - No  Decreased need for sleep - No  Grandiosity - " No  Racing thoughts - No  Impulsivity - No  Irritability- No  Increased energy - No  Distractibility - No  Increase in goal-directed activity or PMA- No    Symptoms of NATE - Continuing  Symptoms of Panic Disorder- No  Symptoms of PTSD - No        Overall progress: Patient is showing mild improvement             Medical ROS  General ROS: negative  Ophthalmic ROS: negative  ENT ROS: negative  Allergy and Immunology ROS: negative  Hematological and Lymphatic ROS: negative  Endocrine ROS: negative  Respiratory ROS: no cough, shortness of breath, or wheezing  Cardiovascular ROS: no chest pain or dyspnea on exertion  Gastrointestinal ROS: no abdominal pain, change in bowel habits, or black or bloody stools  Genito-Urinary ROS: no dysuria, trouble voiding, or hematuria  Musculoskeletal ROS: negative  Neurological ROS: no TIA or stroke symptoms  Dermatological ROS: negative      PAST MEDICAL HISTORY   No past medical history on file.        PSYCHOTROPIC MEDICATIONS   Scheduled Meds:   buPROPion  300 mg Oral Daily    nicotine  1 patch Transdermal Daily    sertraline  75 mg Oral Daily    traZODone  100 mg Oral QHS     Continuous Infusions:  PRN Meds:.hydrOXYzine HCL, melatonin, OLANZapine **AND** OLANZapine        EXAMINATION    VITALS   Vitals:    10/26/23 1946 10/27/23 0730 10/27/23 2011 10/28/23 0745   BP: 134/82 114/66 108/72 114/60   BP Location: Right arm  Left arm Right arm   Patient Position: Sitting  Sitting Sitting   Pulse: 84 78 77 70   Resp: 18 16 18 18   Temp: 97.1 °F (36.2 °C) 97.9 °F (36.6 °C) 97.8 °F (36.6 °C) 97.2 °F (36.2 °C)   TempSrc: Temporal Temporal Temporal Temporal   SpO2:   97%    Weight:       Height:           Body mass index is 23.96 kg/m².      CONSTITUTIONAL  General Appearance: unremarkable, age appropriate, older than stated age, disheveled     MUSCULOSKELETAL  Muscle Strength and Tone:no dyskinesia, no dystonia, no tremor, no tic  Abnormal Involuntary Movements: No  Gait and Station:  non-ataxic     PSYCHIATRIC   Level of Consciousness: awake and alert   Orientation: person, place, time, and situation  Grooming: Casually dressed and Disheveled  Psychomotor Behavior: normal, cooperative, psychomotor retardation, eye contact normal  Speech: normal tone, normal rate, normal pitch, soft, spontaneous  Language: grossly intact, able to name, able to repeat  Mood: anxious   Affect: Anxious, Consistent with mood, Congruent with thought, and Constricted  Thought Process: linear, logical  Associations: intact   Thought Content: less SI, denies HI, and no delusions  Perceptions: denies AH and denies  VH  Memory: Able to recall past events, Remote intact, and Recent intact  Attention:Attends to interview without distraction  Fund of Knowledge: Aware of current events and Vocabulary appropriate   Estimate if Intelligence:  Average based on work/education history, vocabulary and mental status exam  Insight: intact, has awareness of illness- fair  Judgment: behavior is adequate to circumstances- fair    DIAGNOSTIC TESTING   Laboratory Results  No results found for this or any previous visit (from the past 24 hour(s)).          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     Nicotine Dependence  Amphetamine use disorder, severe, continuous with physiological dependence      Psychosocial stressors        PROBLEM LIST AND MANAGEMENT PLANS     Depression: pt counseled  -start trial of Wellbutrin  mg po q day- will increase to 300 mg today  -start adjunctive Zoloft at 25 mg po q day- increase to 50 mg po q day  -increase to 75 mg PO qd tomorrow  - continue trazodone 100 mg PO qhs --> Titrate to 150mg PO nightly on 10/28     Anxiety: pt counseled  -Zoloft as above  -vistaril prn     Insomnia: pt counseled  -vistaril prn     Nicotine Dependence: pt counseled  -started/continue  nicotine 14 mg  patch dermal q day     Amphetamine use disorder: pt counseled  -rehab if willing     Psychosocial stressors: pt counseled  -SW consulted to assist with resources          Discussed diagnosis, risks and benefits of proposed treatment vs alternative treatments vs no treatment, potential side effects of these treatments and the inherent unpredictability of treatment. The patient expresses understanding of the above and displays the capacity to agree with this treatment given said understanding. Patient also agrees that, currently, the benefits outweigh the risks and would like to pursue/continue treatment at this time.    Any medications being used off-label were discussed with the patient inclusive of the evidence base for the use of the medications and consent was obtained for the off-label use of the medication.       DISCHARGE PLANNING  Expected Disposition Plan: Home or Self Care/rehab      NEED FOR CONTINUED HOSPITALIZATION  Psychiatric illness continues to pose a potential threat to life or bodily function, of self or others, thereby requiring the need for continued inpatient psychiatric hospitalization: Yes, due to: danger to self, gravely disabled, and suicidal ideation, as evidenced by:  Ongoing concerns with SI. and Ongoing concerns with grave disability with patient unable to perform basic feeding, hygiene and dressing activities without significant constant support.    Protective inpatient pyschiatric hospitalization required while a safe disposition plan is enacted: Yes    Patient stabilized and ready for discharge from inpatient psychiatric unit: No        STAFF:   Flakito Murillo MD  Psychiatry

## 2023-10-28 NOTE — PLAN OF CARE
Following POC.  Makes needs known.  Denies SI/HI, AVH.  States he had a good day.  Out on the unit most of the most of the evening.  Interacting appropriately with peers and staff.  Medication compliant.  Appetite is good.  Encouraged group attendance.  Free of fall.

## 2023-10-28 NOTE — PLAN OF CARE
"Patient guarded and withdrawn. Soft spoken with delayed responses. Patient logical. Reports " I unfortunately choose drugs over my family but I really want rehab to get my life back together".  Encouraged patient to live a drug free life style.  Encouraged this patient to seek substance abuse rehab to gain insight into illness and to gain coping skills to help facilitate soberity.  Patient reports he is interested in going to rehab. We discussed the CBT and coping skills packet. Patient verbalized good insight into coping skills and CBT. We discussed his coping skills he will use and future plans. Patient denies SI/HI no self harming behavior displayed, no aggressive behavior displayed towards others. Patient contracted safety with staff and unit.  Patient reports on a scale of 1-10 with ten being the highest level of depression. Patient rating depression today a 7/10. On the same scale patient rating anxiety a 4/10. Encouraged patient to attend groups and activities. Educated, reviewed  and discussed plan of care and medication regiment with this patient. Discussed and educated with this patient any concerns that needed to be addressed along with the importance of medication compliance  1:1 with this patient. Patient voices understanding of all teachings.    "

## 2023-10-29 PROCEDURE — 99232 SBSQ HOSP IP/OBS MODERATE 35: CPT | Mod: ,,, | Performed by: PSYCHIATRY & NEUROLOGY

## 2023-10-29 PROCEDURE — 25000003 PHARM REV CODE 250: Performed by: PSYCHIATRY & NEUROLOGY

## 2023-10-29 PROCEDURE — S4991 NICOTINE PATCH NONLEGEND: HCPCS | Performed by: PSYCHIATRY & NEUROLOGY

## 2023-10-29 PROCEDURE — 25000003 PHARM REV CODE 250: Performed by: STUDENT IN AN ORGANIZED HEALTH CARE EDUCATION/TRAINING PROGRAM

## 2023-10-29 PROCEDURE — 99232 PR SUBSEQUENT HOSPITAL CARE,LEVL II: ICD-10-PCS | Mod: ,,, | Performed by: PSYCHIATRY & NEUROLOGY

## 2023-10-29 PROCEDURE — 11400000 HC PSYCH PRIVATE ROOM

## 2023-10-29 RX ADMIN — SERTRALINE HYDROCHLORIDE 75 MG: 50 TABLET ORAL at 08:10

## 2023-10-29 RX ADMIN — Medication 6 MG: at 08:10

## 2023-10-29 RX ADMIN — HYDROXYZINE HYDROCHLORIDE 50 MG: 25 TABLET, FILM COATED ORAL at 08:10

## 2023-10-29 RX ADMIN — NICOTINE 1 PATCH: 14 PATCH, EXTENDED RELEASE TRANSDERMAL at 09:10

## 2023-10-29 RX ADMIN — BUPROPION HYDROCHLORIDE 300 MG: 300 TABLET, FILM COATED, EXTENDED RELEASE ORAL at 08:10

## 2023-10-29 RX ADMIN — TRAZODONE HYDROCHLORIDE 150 MG: 150 TABLET ORAL at 08:10

## 2023-10-29 NOTE — PLAN OF CARE
Following POC.  Makes needs known.  Denies SI/HI, AVH.  States he had a good day.  Out on the unit part of the most of the evening.  Interacting appropriately with peers and staff.  Medication compliant.  Appetite is good.  Encouraged group attendance.  Free of fall.

## 2023-10-29 NOTE — PLAN OF CARE
"Patient guarded and withdrawn. Patient reports " I am feeling better. I would like to go to rehab. I need to find where I left my truck in [place]". We discussed calling the [place] [employer] department to discussed that matter to find his vehicle. Patient denies SI/HI no self harming behavior displayed, no aggressive behavior displayed towards others. Patient contracted safety with staff and unit.  Patient reports on a scale of 1-10 with ten being the highest level of depression. Patient rating depression today a 6/10. On the same scale patient rating anxiety a 4/10. Encouraged patient to attend groups and activities.  Discussed healthy coping skills and techniques.  Educated, reviewed  and discussed plan of care and medication regiment with this patient. Discussed and educated with this patient any concerns that needed to be addressed along with the importance of medication compliance  1:1 with this patient. Patient voices understanding of all teachings.    "

## 2023-10-29 NOTE — PROGRESS NOTES
"PSYCHIATRY DAILY INPATIENT PROGRESS NOTE  SUBSEQUENT HOSPITAL VISIT    ENCOUNTER DATE: 10/29/2023  [facility]    DATE OF ADMISSION: 10/23/2023  3:23 AM  LENGTH OF STAY: 6 days      CHIEF COMPLAINT   Josh Tucker is a 48 y.o. male, seen during daily persaud rounds on the inpatient unit.  Josh Tucker presented with the chief complaint of  depression/addiction/SI, "I was having suicidal thoughts."      The patient was seen and examined. The chart was reviewed.     Reviewed notes from Rns and SW and labs from the last 24 hours.    The patient's case was discussed with the treatment team/care providers today including Rns    Staff reports no behavioral or management issues.     The patient has been compliant with treatment.      Subjective 10/29/2023       Today the patient reports ongoing improvement in mood. Also reports some improvement in sleep last night following titration of trazodone.     Today pt is reporting ongoing anxiety which he describes as severe. States this has improved minimally since admission. Discussed titration of zoloft yesterday and plan to continue titration after adjusting to current dose. He endorses understanding. Does find vistaril helpful for anxiety.     He notes continued symptoms of depression.    He discussed his long standing h/o substance use.   Although his UDS was negative, he was just recently hospitalized for drug related overdose (reportedly within the last week).     Pt remains interested in rehab upon discharge.     The patient denies any side effects to medications.      Psychiatric ROS (observed, reported, or endorsed/denied):  Depressed mood - less  Interest/pleasure/anhedonia: less  Guilt/hopelessness/worthlessness - less  Changes in Sleep - variable  Changes in Appetite - less  Changes in Concentration - less  Changes in Energy - less  PMA/R- Continuing  Suicidal- active/passive ideations - less  Homicidal ideations: active/passive ideations - " No    Hallucinations - No  Delusions - No  Disorganized behavior - No  Disorganized speech - No  Negative symptoms - No    Elevated mood - No  Decreased need for sleep - No  Grandiosity - No  Racing thoughts - No  Impulsivity - No  Irritability- No  Increased energy - No  Distractibility - No  Increase in goal-directed activity or PMA- No    Symptoms of NATE - Continuing  Symptoms of Panic Disorder- No  Symptoms of PTSD - No        Overall progress: Patient is showing mild improvement             Medical ROS  General ROS: negative  Ophthalmic ROS: negative  ENT ROS: negative  Allergy and Immunology ROS: negative  Hematological and Lymphatic ROS: negative  Endocrine ROS: negative  Respiratory ROS: no cough, shortness of breath, or wheezing  Cardiovascular ROS: no chest pain or dyspnea on exertion  Gastrointestinal ROS: no abdominal pain, change in bowel habits, or black or bloody stools  Genito-Urinary ROS: no dysuria, trouble voiding, or hematuria  Musculoskeletal ROS: negative  Neurological ROS: no TIA or stroke symptoms  Dermatological ROS: negative      PAST MEDICAL HISTORY   No past medical history on file.        PSYCHOTROPIC MEDICATIONS   Scheduled Meds:   buPROPion  300 mg Oral Daily    nicotine  1 patch Transdermal Daily    sertraline  75 mg Oral Daily    traZODone  150 mg Oral QHS     Continuous Infusions:  PRN Meds:.hydrOXYzine HCL, melatonin, OLANZapine **AND** OLANZapine        EXAMINATION    VITALS   Vitals:    10/28/23 0745 10/28/23 1949 10/29/23 0800 10/29/23 0816   BP: 114/60 118/67 113/67    BP Location: Right arm Right arm Right arm    Patient Position: Sitting Sitting Sitting    Pulse: 70 72 72    Resp: 18 18 18    Temp: 97.2 °F (36.2 °C) 97.6 °F (36.4 °C) 97.4 °F (36.3 °C)    TempSrc: Temporal Temporal Temporal    SpO2:       Weight:    78.4 kg (172 lb 13.5 oz)   Height:           Body mass index is 24.8 kg/m².      CONSTITUTIONAL  General Appearance: unremarkable, age appropriate, older than  stated age, disheveled     MUSCULOSKELETAL  Muscle Strength and Tone:no dyskinesia, no dystonia, no tremor, no tic  Abnormal Involuntary Movements: No  Gait and Station: non-ataxic     PSYCHIATRIC   Level of Consciousness: awake and alert   Orientation: person, place, time, and situation  Grooming: Casually dressed and Disheveled  Psychomotor Behavior: normal, cooperative, psychomotor retardation, eye contact normal  Speech: normal tone, normal rate, normal pitch, soft, spontaneous  Language: grossly intact, able to name, able to repeat  Mood: anxious   Affect: Anxious, Consistent with mood, Congruent with thought, and Constricted  Thought Process: linear, logical  Associations: intact   Thought Content: less SI, denies HI, and no delusions  Perceptions: denies AH and denies  VH  Memory: Able to recall past events, Remote intact, and Recent intact  Attention:Attends to interview without distraction  Fund of Knowledge: Aware of current events and Vocabulary appropriate   Estimate if Intelligence:  Average based on work/education history, vocabulary and mental status exam  Insight: intact, has awareness of illness- fair  Judgment: behavior is adequate to circumstances- fair    DIAGNOSTIC TESTING   Laboratory Results  No results found for this or any previous visit (from the past 24 hour(s)).          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     Nicotine Dependence  Amphetamine use disorder, severe, continuous with physiological dependence      Psychosocial stressors        PROBLEM LIST AND MANAGEMENT PLANS     Depression: pt counseled  -start trial of Wellbutrin  mg po q day- will increase to 300 mg today  -start adjunctive Zoloft at 25 mg po q day- increase to 50 mg po q day  -increase to 75 mg PO qd 10/28  - continue trazodone 100 mg PO qhs --> Titrate to 150mg PO nightly on 10/28      Anxiety: pt counseled  -Zoloft as above  -vistaril prn     Insomnia: pt counseled  -vistaril prn     Nicotine Dependence: pt counseled  -started/continue  nicotine 14 mg patch dermal q day     Amphetamine use disorder: pt counseled  -rehab if willing     Psychosocial stressors: pt counseled  -SW consulted to assist with resources          Discussed diagnosis, risks and benefits of proposed treatment vs alternative treatments vs no treatment, potential side effects of these treatments and the inherent unpredictability of treatment. The patient expresses understanding of the above and displays the capacity to agree with this treatment given said understanding. Patient also agrees that, currently, the benefits outweigh the risks and would like to pursue/continue treatment at this time.    Any medications being used off-label were discussed with the patient inclusive of the evidence base for the use of the medications and consent was obtained for the off-label use of the medication.       DISCHARGE PLANNING  Expected Disposition Plan: Home or Self Care/rehab      NEED FOR CONTINUED HOSPITALIZATION  Psychiatric illness continues to pose a potential threat to life or bodily function, of self or others, thereby requiring the need for continued inpatient psychiatric hospitalization: Yes, due to: danger to self, gravely disabled, and suicidal ideation, as evidenced by:  Ongoing concerns with SI. and Ongoing concerns with grave disability with patient unable to perform basic feeding, hygiene and dressing activities without significant constant support.    Protective inpatient pyschiatric hospitalization required while a safe disposition plan is enacted: Yes    Patient stabilized and ready for discharge from inpatient psychiatric unit: No        STAFF:   Flakito Murillo MD  Psychiatry

## 2023-10-30 PROCEDURE — 99233 SBSQ HOSP IP/OBS HIGH 50: CPT | Mod: ,,, | Performed by: PSYCHIATRY & NEUROLOGY

## 2023-10-30 PROCEDURE — 90833 PR PSYCHOTHERAPY W/PATIENT W/E&M, 30 MIN (ADD ON): ICD-10-PCS | Mod: ,,, | Performed by: PSYCHIATRY & NEUROLOGY

## 2023-10-30 PROCEDURE — 90833 PSYTX W PT W E/M 30 MIN: CPT | Mod: ,,, | Performed by: PSYCHIATRY & NEUROLOGY

## 2023-10-30 PROCEDURE — 11400000 HC PSYCH PRIVATE ROOM

## 2023-10-30 PROCEDURE — 25000003 PHARM REV CODE 250: Performed by: PSYCHIATRY & NEUROLOGY

## 2023-10-30 PROCEDURE — 99233 PR SUBSEQUENT HOSPITAL CARE,LEVL III: ICD-10-PCS | Mod: ,,, | Performed by: PSYCHIATRY & NEUROLOGY

## 2023-10-30 PROCEDURE — S4991 NICOTINE PATCH NONLEGEND: HCPCS | Performed by: PSYCHIATRY & NEUROLOGY

## 2023-10-30 PROCEDURE — 25000003 PHARM REV CODE 250: Performed by: STUDENT IN AN ORGANIZED HEALTH CARE EDUCATION/TRAINING PROGRAM

## 2023-10-30 RX ORDER — TRAZODONE HYDROCHLORIDE 100 MG/1
200 TABLET ORAL NIGHTLY
Status: DISCONTINUED | OUTPATIENT
Start: 2023-10-30 | End: 2023-11-02 | Stop reason: HOSPADM

## 2023-10-30 RX ORDER — SERTRALINE HYDROCHLORIDE 100 MG/1
100 TABLET, FILM COATED ORAL DAILY
Status: DISCONTINUED | OUTPATIENT
Start: 2023-10-31 | End: 2023-11-02 | Stop reason: HOSPADM

## 2023-10-30 RX ADMIN — HYDROXYZINE HYDROCHLORIDE 50 MG: 25 TABLET, FILM COATED ORAL at 08:10

## 2023-10-30 RX ADMIN — NICOTINE 1 PATCH: 14 PATCH, EXTENDED RELEASE TRANSDERMAL at 08:10

## 2023-10-30 RX ADMIN — Medication 6 MG: at 08:10

## 2023-10-30 RX ADMIN — SERTRALINE HYDROCHLORIDE 75 MG: 50 TABLET ORAL at 08:10

## 2023-10-30 RX ADMIN — TRAZODONE HYDROCHLORIDE 200 MG: 100 TABLET ORAL at 08:10

## 2023-10-30 RX ADMIN — BUPROPION HYDROCHLORIDE 300 MG: 300 TABLET, FILM COATED, EXTENDED RELEASE ORAL at 08:10

## 2023-10-30 NOTE — PROGRESS NOTES
"   10/30/23 1000   [facility] Group Therapy   Group Name Therapeutic Recreation   Specific Interventions Coping Skills Training   Participation Level Appropriate;Attentive;Sharing   Participation Quality Cooperative;Social   Insight/Motivation Applies New Skills;Good   Affect/Mood Display Appropriate   Cognition Alert   Psychomotor WNL     Patient presents calm, cooperative, reports an "alright" mood, "I'm thinking clearer, moving around, talking more. I know that I'm here for a reason. I don't want to die. I just got to make my mind up and stay clean." Patient verbalized he wants to get out and find his truck and camper and then go to a sober living house or 12 step program.  "

## 2023-10-30 NOTE — MEDICAL/APP STUDENT
"PSYCHIATRY DAILY INPATIENT PROGRESS NOTE  SUBSEQUENT HOSPITAL VISIT    ENCOUNTER DATE: 10/30/2023  SITE: [facility]    DATE OF ADMISSION: 10/23/2023  3:23 AM  LENGTH OF STAY: 7 days      CHIEF COMPLAINT   Josh Tucker is a 48 y.o. male, seen during daily persaud rounds on the inpatient unit.  Josh Tucker presented with the chief complaint of  depression/addiction/ SI, "I was having suicidal thoughts."      The patient was seen and examined. The chart was reviewed.     Reviewed notes from Rns, MD, and CTRS and labs from the last 24 hours.    The patient's case was discussed with the treatment team/care providers today including Rns    Staff reports no behavioral or management issues.     The patient has been compliant with treatment.      Subjective 10/30/2023       Today the patient reports "just been thinking about my job and truck has been causing anxiety." Patient explained that he is worried about losing his truck if he goes to rehab. He states he has been "tossing and turning all night." Patient reports this anxiety as a continuation of his baseline anxiety.     Patient describes ongoing improvement in mood. Patient states that he does not know if his medications have been helping. Patient states that he does not feel he still needs to be here stating "I am not suicidal or anything." Patient states that he was clean before and this was "just a relapse" so he doesn't know if he needs to go to rehab. Patient remains open to rehab pending his ability to make sure he can keep his truck and attached camper.      The patient denies any side effects to medications.        Interim/overnight events per report/notes:    Per RN note: "Pt sleeping at this time, slept 8.5 hrs with no awakenings."       Psychiatric ROS (observed, reported, or endorsed/denied):  Depressed mood - improving steadily  Interest/pleasure/anhedonia: fluctuating  Guilt/hopelessness/worthlessness - No  Changes in Sleep - " variable  Changes in Appetite - No  Changes in Concentration - improving steadily  Changes in Energy - less  PMA/R- No  Suicidal- active/passive ideations - No  Homicidal ideations: active/passive ideations - No    Hallucinations - No  Delusions - No  Disorganized behavior - No  Disorganized speech - No  Negative symptoms - No    Elevated mood - No  Decreased need for sleep - No  Grandiosity - No  Racing thoughts - No  Impulsivity - No  Irritability- No  Increased energy - No  Distractibility - No  Increase in goal-directed activity or PMA- No    Symptoms of NATE - Continuing  Symptoms of Panic Disorder- No  Symptoms of PTSD - No        Overall progress: Patient is showing mild improvement         Psychotherapy:  Target symptoms: {PSY TARGET SYMPTOMS:97265}  Why chosen therapy is appropriate versus another modality: {reason:36184}  Outcome monitoring methods: {methods:57191}  Therapeutic intervention type: {types:72534}  Topics discussed/themes: {Topics:18879}  The patient's response to the intervention is {PSY INTERVENTION RESPONSE:75020}. The patient's progress toward treatment goals is {Progress:20262}.   Duration of intervention: *** minutes.        Medical ROS  ROS      PAST MEDICAL HISTORY   No past medical history on file.        PSYCHOTROPIC MEDICATIONS   Scheduled Meds:   buPROPion  300 mg Oral Daily    nicotine  1 patch Transdermal Daily    sertraline  75 mg Oral Daily    traZODone  150 mg Oral QHS     Continuous Infusions:  PRN Meds:.hydrOXYzine HCL, melatonin, OLANZapine **AND** OLANZapine        EXAMINATION    VITALS   Vitals:    10/29/23 0800 10/29/23 0816 10/29/23 1946 10/30/23 0737   BP: 113/67  116/73 108/61   BP Location: Right arm  Left arm Left arm   Patient Position: Sitting  Sitting Lying   Pulse: 72  67 73   Resp: 18  18 18   Temp: 97.4 °F (36.3 °C)  97.3 °F (36.3 °C) 98.2 °F (36.8 °C)   TempSrc: Temporal  Temporal Temporal   SpO2:       Weight:  78.4 kg (172 lb 13.5 oz)     Height:            Body mass index is 24.8 kg/m².        CONSTITUTIONAL  General Appearance: unremarkable, older than stated age, disheveled    MUSCULOSKELETAL  Muscle Strength and Tone:no dyskinesia, no dystonia, no tremor, no tic  Abnormal Involuntary Movements: No  Gait and Station: non-ataxic    PSYCHIATRIC   Level of Consciousness: awake and alert   Orientation: person, place, and situation  Grooming: Casually dressed and Disheveled  Psychomotor Behavior: normal, cooperative, psychomotor retardation, eye contact normal  Speech: normal tone, normal rate, normal pitch, normal volume  Language: grossly intact  Mood: anxious  Affect: Consistent with mood  Thought Process: linear, logical  Associations: intact   Thought Content: denies SI and denies HI  Perceptions: denies AH and denies  VH  Memory: Able to recall past events, Remote intact, and Recent intact  Attention:Attends to interview without distraction  Fund of Knowledge: Aware of current events and Vocabulary appropriate   Estimate if Intelligence:  Average based on work/education history, vocabulary and mental status exam  Insight: has awareness of illness  Judgment: behavior is adequate to circumstances        DIAGNOSTIC TESTING   Laboratory Results  No results found for this or any previous visit (from the past 24 hour(s)).          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    Nicotine Dependence  Amphetamine use disorder, severe, continuous with physiological dependence    Psychosocial stressors    PROBLEM LIST AND MANAGEMENT PLANS    Depression: pt counseled  - continue on Wellbutrin  mg po q day  - continue adjunctive Zoloft at 75 mg po q day  - continue trazadone 150 mg PO qhs    Anxiety: pt counseled  -Zoloft as above  - vistaril prn    Insomnia: pt counseled  -vistaril prn    Nicotine dependence: pt counseled  - continue nicotine 14 mg  patch dermis q day    Amphetamine use disorder: pt counseled  - rehab if willing    Psychosocial stressors: pt counseled  - SW consulted to assist with resources      Discussed diagnosis, risks and benefits of proposed treatment vs alternative treatments vs no treatment, potential side effects of these treatments and the inherent unpredictability of treatment. The patient expresses understanding of the above and displays the capacity to agree with this treatment given said understanding. Patient also agrees that, currently, the benefits outweigh the risks and would like to pursue/continue treatment at this time.    Any medications being used off-label were discussed with the patient inclusive of the evidence base for the use of the medications and consent was obtained for the off-label use of the medication.       DISCHARGE PLANNING  Expected Disposition Plan: Home or Self Care/rehab      NEED FOR CONTINUED HOSPITALIZATION  Psychiatric illness continues to pose a potential threat to life or bodily function, of self or others, thereby requiring the need for continued inpatient psychiatric hospitalization: Yes, due to: danger to self, gravely disabled, and suicidal ideation, as evidenced by:  Ongoing concerns with SI. and Ongoing concerns with grave disability with patient unable to perform basic feeding, hygiene and dressing activities without significant constant support.    Protective inpatient pyschiatric hospitalization required while a safe disposition plan is enacted: Yes    Patient stabilized and ready for discharge from inpatient psychiatric unit: No        STAFF:     Psychiatry

## 2023-10-30 NOTE — PLAN OF CARE
Following POC.  Makes needs known.  Denies SI/HI, AVH.  Out for meds and snacks.  Isolates in his room.  States he is feeling ok.  Trying to locate his truck and camper,  States he called the [facility] about it,   will call the police tomorrow.  Medication compliance.  Appetite is good.  Encouraged self help.   Free of fall.

## 2023-10-30 NOTE — PLAN OF CARE
"Patient blunted affect with depressed mood. Patient interacting with select group of peers. Patient reports "I am feeling better and I want to go to rehab but I don't know where I left my truck and camper. The police came get me and I don't remember where I parked it in [place]". I gave this patient the phone numbers of [place] [facility] and [facility] [place] on [address] and the [place] Police station so he could call to seek information concerning his vehicle. Patient reports he does want substance abuse rehab to gain insight into illness and gain coping skills to reach soberity. We discussed coping skills and CBT.  Encouraged patient to live a drug free life style.  Encouraged this patient to seek substance abuse rehab to gain insight into illness and to gain coping skills to help facilitate soberity.  Discussed healthy coping skills and techniques.  Patient reports on a scale of 1-10 with ten being the highest level of depression. Patient rating depression today a 7/10. On the same scale patient rating anxiety a 6/10. Encouraged patient to attend groups and activities.  Patient denies SI/HI no self harming behavior displayed, no aggressive behavior displayed towards others. Patient contracted safety with staff and unit.  Educated, reviewed  and discussed plan of care and medication regiment with this patient. Discussed and educated with this patient any concerns that needed to be addressed along with the importance of medication compliance  1:1 with this patient. Patient voices understanding of all teachings.    "

## 2023-10-30 NOTE — PROGRESS NOTES
"PSYCHIATRY DAILY INPATIENT PROGRESS NOTE  SUBSEQUENT HOSPITAL VISIT    ENCOUNTER DATE: 10/30/2023  [facility]    DATE OF ADMISSION: 10/23/2023  3:23 AM  LENGTH OF STAY: 7 days      CHIEF COMPLAINT   oJsh Tucker is a 48 y.o. male, seen during daily persaud rounds on the inpatient unit.  Josh Tucker presented with the chief complaint of  depression/addiction/SI, "I was having suicidal thoughts."      The patient was seen and examined. The chart was reviewed.     Reviewed notes from Rns and SW and labs from the last 24 hours.    The patient's case was discussed with the treatment team/care providers today including Rns    Staff reports no behavioral or management issues.     The patient has been compliant with treatment.      Subjective 10/30/2023       Yesterday the patient reports: ongoing improvement in mood. Also reports some improvement in sleep last night following titration of trazodone.   Today pt is reporting ongoing anxiety which he describes as severe. States this has improved minimally since admission. Discussed titration of zoloft yesterday and plan to continue titration after adjusting to current dose. He endorses understanding. Does find vistaril helpful for anxiety.   He notes continued symptoms of depression.  He discussed his long standing h/o substance use.   Although his UDS was negative, he was just recently hospitalized for drug related overdose (reportedly within the last week).   Pt remains interested in rehab upon discharge.     Today, he reports that he is making steady progress- he notes continued but less symptoms of depression.;anxiety; sleep remains impaired.  His discussed his stressors in detail  He denied withdrawals or cravings  He would like rehab vs sober living when stable.    The patient denies any side effects to medications.      Psychiatric ROS (observed, reported, or endorsed/denied):  Depressed mood - less  Interest/pleasure/anhedonia: " less  Guilt/hopelessness/worthlessness - less  Changes in Sleep - variable  Changes in Appetite - less  Changes in Concentration - less  Changes in Energy - less  PMA/R- Continuing  Suicidal- active/passive ideations - less  Homicidal ideations: active/passive ideations - No    Hallucinations - No  Delusions - No  Disorganized behavior - No  Disorganized speech - No  Negative symptoms - No    Elevated mood - No  Decreased need for sleep - No  Grandiosity - No  Racing thoughts - No  Impulsivity - No  Irritability- No  Increased energy - No  Distractibility - No  Increase in goal-directed activity or PMA- No    Symptoms of NATE - Continuing  Symptoms of Panic Disorder- No  Symptoms of PTSD - No        Overall progress: Patient is showing mild improvement       Psychotherapy:  Target symptoms: depression, anxiety , substance abuse  Why chosen therapy is appropriate versus another modality: relevant to diagnosis, patient responds to this modality, evidence based practice  Outcome monitoring methods: self-report, observation  Therapeutic intervention type: insight oriented psychotherapy, behavior modifying psychotherapy, supportive psychotherapy, interactive psychotherapy  Topics discussed/themes: building skills sets for symptom management, symptom recognition, substance abuse  The patient's response to the intervention is accepting. The patient's progress toward treatment goals is fair.   Duration of intervention: 16 minutes      Medical ROS  General ROS: negative  Ophthalmic ROS: negative  ENT ROS: negative  Allergy and Immunology ROS: negative  Hematological and Lymphatic ROS: negative  Endocrine ROS: negative  Respiratory ROS: no cough, shortness of breath, or wheezing  Cardiovascular ROS: no chest pain or dyspnea on exertion  Gastrointestinal ROS: no abdominal pain, change in bowel habits, or black or bloody stools  Genito-Urinary ROS: no dysuria, trouble voiding, or hematuria  Musculoskeletal ROS:  negative  Neurological ROS: no TIA or stroke symptoms  Dermatological ROS: negative      PAST MEDICAL HISTORY   No past medical history on file.        PSYCHOTROPIC MEDICATIONS   Scheduled Meds:   buPROPion  300 mg Oral Daily    nicotine  1 patch Transdermal Daily    sertraline  75 mg Oral Daily    traZODone  150 mg Oral QHS     Continuous Infusions:  PRN Meds:.hydrOXYzine HCL, melatonin, OLANZapine **AND** OLANZapine        EXAMINATION    VITALS   Vitals:    10/29/23 0800 10/29/23 0816 10/29/23 1946 10/30/23 0737   BP: 113/67  116/73 108/61   BP Location: Right arm  Left arm Left arm   Patient Position: Sitting  Sitting Lying   Pulse: 72  67 73   Resp: 18  18 18   Temp: 97.4 °F (36.3 °C)  97.3 °F (36.3 °C) 98.2 °F (36.8 °C)   TempSrc: Temporal  Temporal Temporal   SpO2:       Weight:  78.4 kg (172 lb 13.5 oz)     Height:           Body mass index is 24.8 kg/m².      CONSTITUTIONAL  General Appearance: unremarkable, age appropriate, older than stated age, disheveled     MUSCULOSKELETAL  Muscle Strength and Tone:no dyskinesia, no dystonia, no tremor, no tic  Abnormal Involuntary Movements: No  Gait and Station: non-ataxic     PSYCHIATRIC   Level of Consciousness: awake and alert   Orientation: person, place, time, and situation  Grooming: Casually dressed and Disheveled  Psychomotor Behavior: normal, cooperative, psychomotor retardation, eye contact normal  Speech: normal tone, normal rate, normal pitch, soft, spontaneous  Language: grossly intact, able to name, able to repeat  Mood: anxious   Affect: Anxious, Consistent with mood, Congruent with thought, and Constricted  Thought Process: linear, logical  Associations: intact   Thought Content: less SI, denies HI, and no delusions  Perceptions: denies AH and denies  VH  Memory: Able to recall past events, Remote intact, and Recent intact  Attention:Attends to interview without distraction  Fund of Knowledge: Aware of current events and Vocabulary appropriate    Estimate if Intelligence:  Average based on work/education history, vocabulary and mental status exam  Insight: intact, has awareness of illness- fair  Judgment: behavior is adequate to circumstances- fair    DIAGNOSTIC TESTING   Laboratory Results  No results found for this or any previous visit (from the past 24 hour(s)).          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     Nicotine Dependence  Amphetamine use disorder, severe, continuous with physiological dependence      Psychosocial stressors        PROBLEM LIST AND MANAGEMENT PLANS     Depression: pt counseled  -start trial of Wellbutrin  mg po q day- increase to/continue at 300 mg po q day  -start adjunctive Zoloft at 25 mg po q day- increase to 50 mg po q day  -increase to 75 mg PO qd 10/28- increase to 100 mg po q day tomorrow  - continue trazodone 100 mg PO qhs --> Titrate to 150mg PO nightly on 10/28- increase to 200 mg po q HS     Anxiety: pt counseled  -Zoloft as above  -vistaril prn     Insomnia: pt counseled  -vistaril prn     Nicotine Dependence: pt counseled  -started/continue  nicotine 14 mg patch dermal q day     Amphetamine use disorder: pt counseled  -rehab if willing     Psychosocial stressors: pt counseled  -SW consulted to assist with resources          Discussed diagnosis, risks and benefits of proposed treatment vs alternative treatments vs no treatment, potential side effects of these treatments and the inherent unpredictability of treatment. The patient expresses understanding of the above and displays the capacity to agree with this treatment given said understanding. Patient also agrees that, currently, the benefits outweigh the risks and would like to pursue/continue treatment at this time.    Any medications being used off-label were discussed with the patient inclusive of the evidence base for the use of  the medications and consent was obtained for the off-label use of the medication.       DISCHARGE PLANNING  Expected Disposition Plan: Home or Self Care/rehab- anticipate patient to be stable for discharge on Wednesday      NEED FOR CONTINUED HOSPITALIZATION  Psychiatric illness continues to pose a potential threat to life or bodily function, of self or others, thereby requiring the need for continued inpatient psychiatric hospitalization: Yes, due to: danger to self, gravely disabled, and suicidal ideation, as evidenced by:  Ongoing concerns with SI. and Ongoing concerns with grave disability with patient unable to perform basic feeding, hygiene and dressing activities without significant constant support.    Protective inpatient pyschiatric hospitalization required while a safe disposition plan is enacted: Yes    Patient stabilized and ready for discharge from inpatient psychiatric unit: No        STAFF:   Austyn Méndez MD  Psychiatry

## 2023-10-30 NOTE — PROGRESS NOTES
"   10/30/23 1445   [facility] Group Therapy   Group Name Therapeutic Recreation   Specific Interventions Cognitive Stimulation Training   Participation Level Appropriate;Attentive   Participation Quality Cooperative;Social   Insight/Motivation Good   Affect/Mood Display Appropriate   Cognition Alert   Psychomotor WNL     Patient presents calm, cooperative, reports a "good" mood, shows interest, remains involved, enjoys the activity.  "

## 2023-10-31 PROBLEM — F33.2 MDD (MAJOR DEPRESSIVE DISORDER), RECURRENT SEVERE, WITHOUT PSYCHOSIS: Status: ACTIVE | Noted: 2023-10-31

## 2023-10-31 PROBLEM — R45.851 SUICIDAL IDEATION: Status: RESOLVED | Noted: 2023-10-23 | Resolved: 2023-10-31

## 2023-10-31 PROBLEM — R82.90 ABNORMAL URINALYSIS: Status: RESOLVED | Noted: 2023-10-23 | Resolved: 2023-10-31

## 2023-10-31 PROCEDURE — 25000003 PHARM REV CODE 250: Performed by: PSYCHIATRY & NEUROLOGY

## 2023-10-31 PROCEDURE — 99233 PR SUBSEQUENT HOSPITAL CARE,LEVL III: ICD-10-PCS | Mod: ,,, | Performed by: PSYCHIATRY & NEUROLOGY

## 2023-10-31 PROCEDURE — 11400000 HC PSYCH PRIVATE ROOM

## 2023-10-31 PROCEDURE — 90833 PSYTX W PT W E/M 30 MIN: CPT | Mod: ,,, | Performed by: PSYCHIATRY & NEUROLOGY

## 2023-10-31 PROCEDURE — S4991 NICOTINE PATCH NONLEGEND: HCPCS | Performed by: PSYCHIATRY & NEUROLOGY

## 2023-10-31 PROCEDURE — 90833 PR PSYCHOTHERAPY W/PATIENT W/E&M, 30 MIN (ADD ON): ICD-10-PCS | Mod: ,,, | Performed by: PSYCHIATRY & NEUROLOGY

## 2023-10-31 PROCEDURE — 25000003 PHARM REV CODE 250: Performed by: STUDENT IN AN ORGANIZED HEALTH CARE EDUCATION/TRAINING PROGRAM

## 2023-10-31 PROCEDURE — 99233 SBSQ HOSP IP/OBS HIGH 50: CPT | Mod: ,,, | Performed by: PSYCHIATRY & NEUROLOGY

## 2023-10-31 RX ORDER — TALC
6 POWDER (GRAM) TOPICAL NIGHTLY
Qty: 60 TABLET | Refills: 1 | Status: SHIPPED | OUTPATIENT
Start: 2023-10-31 | End: 2023-11-01 | Stop reason: SDUPTHER

## 2023-10-31 RX ORDER — TRAZODONE HYDROCHLORIDE 100 MG/1
200 TABLET ORAL NIGHTLY
Qty: 60 TABLET | Refills: 1 | Status: SHIPPED | OUTPATIENT
Start: 2023-10-31 | End: 2023-11-01 | Stop reason: SDUPTHER

## 2023-10-31 RX ORDER — HYDROXYZINE HYDROCHLORIDE 50 MG/1
50 TABLET, FILM COATED ORAL EVERY 6 HOURS PRN
Qty: 30 TABLET | Refills: 1 | Status: SHIPPED | OUTPATIENT
Start: 2023-10-31 | End: 2023-11-01 | Stop reason: SDUPTHER

## 2023-10-31 RX ORDER — SERTRALINE HYDROCHLORIDE 100 MG/1
100 TABLET, FILM COATED ORAL DAILY
Qty: 30 TABLET | Refills: 1 | Status: SHIPPED | OUTPATIENT
Start: 2023-11-01 | End: 2023-11-01 | Stop reason: SDUPTHER

## 2023-10-31 RX ORDER — BUPROPION HYDROCHLORIDE 300 MG/1
300 TABLET ORAL DAILY
Qty: 30 TABLET | Refills: 1 | Status: SHIPPED | OUTPATIENT
Start: 2023-11-01 | End: 2023-11-01 | Stop reason: SDUPTHER

## 2023-10-31 RX ADMIN — BUPROPION HYDROCHLORIDE 300 MG: 300 TABLET, FILM COATED, EXTENDED RELEASE ORAL at 08:10

## 2023-10-31 RX ADMIN — TRAZODONE HYDROCHLORIDE 200 MG: 100 TABLET ORAL at 08:10

## 2023-10-31 RX ADMIN — SERTRALINE 100 MG: 100 TABLET, FILM COATED ORAL at 08:10

## 2023-10-31 RX ADMIN — NICOTINE 1 PATCH: 14 PATCH, EXTENDED RELEASE TRANSDERMAL at 08:10

## 2023-10-31 NOTE — PLAN OF CARE
Recommendations  1. Rec continue regular diet.   2. Rec continue monitoring weights at least weekly to assess for any acute weight changes.   3. RD to follow and make recs accordingly.    Interventions  1. Increased protein diet  2. Increased energy diet  3. Collaboration with other providers    Goals: Pt will continue to meet at least 75% ENN by RD follow up.  Nutrition Goal Status: goal met

## 2023-10-31 NOTE — PLAN OF CARE
Pt is sleeping at this time and has slept 6 hours with 2 awakenings.  NAD.  Resp even & unlabored.  Pathways clear.  Q 15 minute safety checks ongoing.  All precautions maintained

## 2023-10-31 NOTE — PLAN OF CARE
Patient out on unit. Interacting with select peers, attending group this am. Accepting meals and meds. Reports he is wanting to go to rehab or to a sober living, get a job, and try to get his life together. He is calm and cooperative. Mood improved. No falls, Safety maintained.

## 2023-10-31 NOTE — PSYCH
The patient will reside with a relative at [address] [place] [address]. The patient's health insurance does not cover transportation; therefore, the house supervisor will need to approve the ride and Julia's transportation will have to be contacted for availability.

## 2023-10-31 NOTE — PROGRESS NOTES
[facility]  Adult Nutrition  Progess Note    SUMMARY     Recommendations  1. Rec continue regular diet.   2. Rec continue monitoring weights at least weekly to assess for any acute weight changes.   3. RD to follow and make recs accordingly.    Interventions  1. Increased protein diet  2. Increased energy diet  3. Collaboration with other providers    Goals: Pt will continue to meet at least 75% ENN by RD follow up.  Nutrition Goal Status: goal met  Communication of RD Recs: other (comment) (POC)    Assessment and Plan    Nutrition Problem  Increased protein energy needs    Related to (etiology):   Conditions associated with diagnoses    Signs and Symptoms (as evidenced by):   H/o of methamphetamine abuse    Interventions/Recommendations (treatment strategy):  Recommendations  1. Rec continue regular diet.   2. Rec continue monitoring weights at least weekly to assess for any acute weight changes.   3. RD to follow and make recs accordingly.    Interventions  1. Increased protein diet  2. Increased energy diet  3. Collaboration with other providers    Goals: Pt will continue to meet at least 75% ENN by RD follow up.  Nutrition Goal Status: goal met    Nutrition Diagnosis Status:   Improving         Malnutrition Assessment           NFPE -- PEC                     Reason for Assessment    Reason For Assessment: RD follow up  Diagnosis: psychological disorder (SI)  Relevant Medical History: anxiety, depression, HTN  Interdisciplinary Rounds: did not attend    General Information Comments:     10/24/2023  RD consult received for 47 yo M. On regular diet -- consuming 100% of meals at this time. Pt has increased nutritional needs for substance abuse; however, patient meeting needs on regular diet. No diet order changes necessary. No recent weight hx found in chart review. Will continue to monitor for any nutrition related changes.    10/31/2023  Patient continues on regular diet with consuming %  "of meals with snacks. Noted patient with steady improvement in mood and sleep. Weight gain noted since initial consult. LBM: 10/30/2023. Will continue to monitor.        Nutrition Discharge Planning: Pt to dc on general healthful diet    Nutrition Risk Screen    Nutrition Risk Screen: no indicators present    Nutrition/Diet History    Food Allergies: NKFA  Factors Affecting Nutritional Intake: None identified at this time, other (see comments) (Patient consuming 100% of meals)    Anthropometrics    Temp: 98.2 °F (36.8 °C)  Height Method: Stated  Height: 5' 10" (177.8 cm)  Height (inches): 70 in  Weight Method: Standard Scale  Weight:  (weight)  Weight (lb): 172.84 lb  Ideal Body Weight (IBW), Male: 166 lb  % Ideal Body Weight, Male (lb): 101.13 %  BMI (Calculated): 24.8  BMI Grade: 18.5-24.9 - normal       Lab/Procedures/Meds    Pertinent Labs Reviewed: reviewed  Pertinent Medications Reviewed: reviewed  Pertinent Medications Comments: Wellbutrin, Zoloft, nicotine patch, trazodone    Physical Findings/Assessment         Estimated/Assessed Needs    Weight Used For Calorie Calculations: 75.4 kg (166 lb 5.4 oz) (IBW)  Energy Calorie Requirements (kcal): 2233-4760 (30-35 kcal/kg for substance abuse)  Energy Need Method: Kcal/kg  Protein Requirements: 76-91 g (1-1.2 g/kg)  Weight Used For Protein Calculations: 76.1 kg (167 lb 12.3 oz)  Fluid Requirements (mL): 1 mL/kcal  Estimated Fluid Requirement Method: RDA Method (or per MD)  RDA Method (mL): 2264         Nutrition Prescription Ordered    Current Diet Order: regular    Evaluation of Received Nutrient/Fluid Intake    % Kcal Needs: %  % Protein Needs: %  I/O: N/A  Energy Calories Required: meeting needs  Protein Required: meeting needs  Fluid Required: meeting needs    Tolerance: tolerating  % Intake of Estimated Energy Needs: 75 - 100 %  % Meal Intake: 75 - 100 %    Nutrition Risk    Level of Risk/Frequency of Follow-up: low (1 x per week)       Monitor " and Evaluation    Food and Nutrient Intake: energy intake, food and beverage intake  Food and Nutrient Adminstration: diet order  Knowledge/Beliefs/Attitudes: food and nutrition knowledge/skill, beliefs and attitudes  Physical Activity and Function: nutrition-related ADLs and IADLs, factors affecting access to physical activity  Anthropometric Measurements: height/length, weight, weight change, body mass index  Biochemical Data, Medical Tests and Procedures: electrolyte and renal panel, gastrointestinal profile, glucose/endocrine profile, inflammatory profile, lipid profile       Nutrition Follow-Up    RD Follow-up?: Yes

## 2023-10-31 NOTE — PROGRESS NOTES
"PSYCHIATRY DAILY INPATIENT PROGRESS NOTE  SUBSEQUENT HOSPITAL VISIT    ENCOUNTER DATE: 10/31/2023  [facility]    DATE OF ADMISSION: 10/23/2023  3:23 AM  LENGTH OF STAY: 8 days      CHIEF COMPLAINT   Josh Tucker is a 48 y.o. male, seen during daily persaud rounds on the inpatient unit.  Josh Tucker presented with the chief complaint of  depression/addiction/SI, "I was having suicidal thoughts."      The patient was seen and examined. The chart was reviewed.     Reviewed notes from Rns, CTRS, and RD and labs from the last 24 hours.    The patient's case was discussed with the treatment team/care providers today including Rns and Speciality services    Staff reports no behavioral or management issues.     The patient has been compliant with treatment.      Subjective 10/31/2023       Today, he reports that he continues  making steady progress- he notes improving symptoms of depression/anxiety; sleep is steadily improving  His discussed his stressors in detail- he feels less overwhelmed and better able to cope  He denied withdrawals or cravings  He is still considering rehab vs sober living when stable. He will be stable for discharge tomorrow if further improved.    He is more hopeful and future oriented; he discussed his short and long term goals.     The patient denies any side effects to medications.      Psychiatric ROS (observed, reported, or endorsed/denied):  Depressed mood - improving steadily  Interest/pleasure/anhedonia: improving steadily  Guilt/hopelessness/worthlessness - improving steadily  Changes in Sleep - improving steadily  Changes in Appetite - improving steadily  Changes in Concentration - improving steadily  Changes in Energy - improving steadily  PMA/R- improving steadily  Suicidal- active/passive ideations - improving steadily  Homicidal ideations: active/passive ideations - No    Hallucinations - No  Delusions - No  Disorganized behavior - No  Disorganized speech - " No  Negative symptoms - No    Elevated mood - No  Decreased need for sleep - No  Grandiosity - No  Racing thoughts - No  Impulsivity - No  Irritability- No  Increased energy - No  Distractibility - No  Increase in goal-directed activity or PMA- No    Symptoms of NATE - improving steadily  Symptoms of Panic Disorder- No  Symptoms of PTSD - No        Overall progress: Patient is showing moderate improvement      Psychotherapy:  Target symptoms: depression, anxiety , substance abuse  Why chosen therapy is appropriate versus another modality: relevant to diagnosis, patient responds to this modality, evidence based practice  Outcome monitoring methods: self-report, observation  Therapeutic intervention type: insight oriented psychotherapy, behavior modifying psychotherapy, supportive psychotherapy, interactive psychotherapy  Topics discussed/themes: building skills sets for symptom management, symptom recognition, substance abuse  The patient's response to the intervention is accepting. The patient's progress toward treatment goals is fair.   Duration of intervention: 16 minutes      Medical ROS  General ROS: negative  Ophthalmic ROS: negative  ENT ROS: negative  Allergy and Immunology ROS: negative  Hematological and Lymphatic ROS: negative  Endocrine ROS: negative  Respiratory ROS: no cough, shortness of breath, or wheezing  Cardiovascular ROS: no chest pain or dyspnea on exertion  Gastrointestinal ROS: no abdominal pain, change in bowel habits, or black or bloody stools  Genito-Urinary ROS: no dysuria, trouble voiding, or hematuria  Musculoskeletal ROS: negative  Neurological ROS: no TIA or stroke symptoms  Dermatological ROS: negative      PAST MEDICAL HISTORY   No past medical history on file.        PSYCHOTROPIC MEDICATIONS   Scheduled Meds:   buPROPion  300 mg Oral Daily    nicotine  1 patch Transdermal Daily    sertraline  100 mg Oral Daily    traZODone  200 mg Oral QHS     Continuous Infusions:  PRN  Meds:.hydrOXYzine HCL, melatonin, OLANZapine **AND** OLANZapine        EXAMINATION    VITALS   Vitals:    10/29/23 1946 10/30/23 0737 10/30/23 2003 10/31/23 0800   BP: 116/73 108/61 117/68 105/64   BP Location: Left arm Left arm Left arm Left arm   Patient Position: Sitting Lying Sitting Lying   Pulse: 67 73 71 72   Resp: 18 18 20 18   Temp: 97.3 °F (36.3 °C) 98.2 °F (36.8 °C) 98.4 °F (36.9 °C) 98.2 °F (36.8 °C)   TempSrc: Temporal Temporal Temporal Temporal   SpO2:       Weight:       Height:           Body mass index is 24.8 kg/m².      CONSTITUTIONAL  General Appearance: unremarkable, age appropriate, older than stated age, disheveled     MUSCULOSKELETAL  Muscle Strength and Tone:no dyskinesia, no dystonia, no tremor, no tic  Abnormal Involuntary Movements: No  Gait and Station: non-ataxic     PSYCHIATRIC   Level of Consciousness: awake and alert   Orientation: person, place, time, and situation  Grooming: Casually dressed and Disheveled  Psychomotor Behavior: normal, cooperative, psychomotor retardation, eye contact normal  Speech: normal tone, normal rate, normal pitch, soft, spontaneous  Language: grossly intact, able to name, able to repeat  Mood: less anxious   Affect: less Anxious, Consistent with mood, Congruent with thought, and less Constricted  Thought Process: linear, logical  Associations: intact   Thought Content: no SI, denies HI, and no delusions  Perceptions: denies AH and denies  VH  Memory: Able to recall past events, Remote intact, and Recent intact  Attention:Attends to interview without distraction  Fund of Knowledge: Aware of current events and Vocabulary appropriate   Estimate if Intelligence:  Average based on work/education history, vocabulary and mental status exam  Insight: intact, has awareness of illness- fair  Judgment: behavior is adequate to circumstances- fair    DIAGNOSTIC TESTING   Laboratory Results  No results found for this or any previous visit (from the past 24  hour(s)).          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     Nicotine Dependence  Amphetamine use disorder, severe, continuous with physiological dependence      Psychosocial stressors        PROBLEM LIST AND MANAGEMENT PLANS     Depression: pt counseled  -start trial of Wellbutrin  mg po q day- increase to/continue at 300 mg po q day  -start adjunctive Zoloft at 25 mg po q day- increase to 50 mg po q day  -increase to 75 mg PO qd 10/28- increase to 100 mg po q day today  - continue trazodone 100 mg PO qhs --> Titrate to 150mg PO nightly on 10/28- increase to/continue at 200 mg po q HS     Anxiety: pt counseled  -Zoloft as above  -vistaril prn     Insomnia: pt counseled  -vistaril prn     Nicotine Dependence: pt counseled  -started/continue  nicotine 14 mg patch dermal q day     Amphetamine use disorder: pt counseled  -rehab if willing     Psychosocial stressors: pt counseled  -SW consulted to assist with resources          Discussed diagnosis, risks and benefits of proposed treatment vs alternative treatments vs no treatment, potential side effects of these treatments and the inherent unpredictability of treatment. The patient expresses understanding of the above and displays the capacity to agree with this treatment given said understanding. Patient also agrees that, currently, the benefits outweigh the risks and would like to pursue/continue treatment at this time.    Any medications being used off-label were discussed with the patient inclusive of the evidence base for the use of the medications and consent was obtained for the off-label use of the medication.       DISCHARGE PLANNING  Expected Disposition Plan: Home or Self Care/rehab-  patient will be stable for discharge on Wednesday (tomorrow)      NEED FOR CONTINUED HOSPITALIZATION  Psychiatric illness continues to pose a  potential threat to life or bodily function, of self or others, thereby requiring the need for continued inpatient psychiatric hospitalization: Yes, due to: danger to self, gravely disabled, and suicidal ideation, as evidenced by:  Concerns with SI--Fading. and Ongoing concerns with grave disability with patient unable to perform basic feeding, hygiene and dressing activities without significant constant support.    Protective inpatient pyschiatric hospitalization required while a safe disposition plan is enacted: Yes    Patient stabilized and ready for discharge from inpatient psychiatric unit: No        STAFF:   Austyn Méndez MD  Psychiatry

## 2023-10-31 NOTE — PLAN OF CARE
Pt calm and cooperative, out on unit more interacting better with staff and peers,denies SI at this time, med compliant, safety precautions maintained, will continue to monitor

## 2023-10-31 NOTE — PROGRESS NOTES
"   10/31/23 1030   [facility] Group Therapy   Group Name Therapeutic Recreation   Specific Interventions Leisure Counseling   Participation Level Appropriate;Attentive;Sharing   Participation Quality Cooperative;Social   Insight/Motivation Applies New Skills;Good   Affect/Mood Display Appropriate   Cognition Alert   Psychomotor WNL     Patient presents calm, cooperative, willing to explore new leisure options, reports "good" mood, "feeling hopeful, been able to regroup and get myself together.'  "

## 2023-11-01 PROCEDURE — 90833 PR PSYCHOTHERAPY W/PATIENT W/E&M, 30 MIN (ADD ON): ICD-10-PCS | Mod: ,,, | Performed by: PSYCHIATRY & NEUROLOGY

## 2023-11-01 PROCEDURE — 11400000 HC PSYCH PRIVATE ROOM

## 2023-11-01 PROCEDURE — 99232 SBSQ HOSP IP/OBS MODERATE 35: CPT | Mod: ,,, | Performed by: PSYCHIATRY & NEUROLOGY

## 2023-11-01 PROCEDURE — 90833 PSYTX W PT W E/M 30 MIN: CPT | Mod: ,,, | Performed by: PSYCHIATRY & NEUROLOGY

## 2023-11-01 PROCEDURE — 25000003 PHARM REV CODE 250: Performed by: PSYCHIATRY & NEUROLOGY

## 2023-11-01 PROCEDURE — 99232 PR SUBSEQUENT HOSPITAL CARE,LEVL II: ICD-10-PCS | Mod: ,,, | Performed by: PSYCHIATRY & NEUROLOGY

## 2023-11-01 PROCEDURE — 25000003 PHARM REV CODE 250: Performed by: STUDENT IN AN ORGANIZED HEALTH CARE EDUCATION/TRAINING PROGRAM

## 2023-11-01 PROCEDURE — S4991 NICOTINE PATCH NONLEGEND: HCPCS | Performed by: PSYCHIATRY & NEUROLOGY

## 2023-11-01 RX ORDER — HYDROXYZINE HYDROCHLORIDE 50 MG/1
50 TABLET, FILM COATED ORAL EVERY 6 HOURS PRN
Qty: 30 TABLET | Refills: 1 | Status: SHIPPED | OUTPATIENT
Start: 2023-11-01 | End: 2023-11-02 | Stop reason: SDUPTHER

## 2023-11-01 RX ORDER — TALC
6 POWDER (GRAM) TOPICAL NIGHTLY
Qty: 60 TABLET | Refills: 1 | Status: SHIPPED | OUTPATIENT
Start: 2023-11-01 | End: 2023-11-02 | Stop reason: SDUPTHER

## 2023-11-01 RX ORDER — TRAZODONE HYDROCHLORIDE 100 MG/1
200 TABLET ORAL NIGHTLY
Qty: 60 TABLET | Refills: 1 | Status: SHIPPED | OUTPATIENT
Start: 2023-11-01 | End: 2023-11-02 | Stop reason: SDUPTHER

## 2023-11-01 RX ORDER — SERTRALINE HYDROCHLORIDE 100 MG/1
100 TABLET, FILM COATED ORAL DAILY
Qty: 30 TABLET | Refills: 1 | Status: SHIPPED | OUTPATIENT
Start: 2023-11-01 | End: 2023-11-02 | Stop reason: SDUPTHER

## 2023-11-01 RX ORDER — BUPROPION HYDROCHLORIDE 300 MG/1
300 TABLET ORAL DAILY
Qty: 30 TABLET | Refills: 1 | Status: SHIPPED | OUTPATIENT
Start: 2023-11-01 | End: 2023-11-02 | Stop reason: SDUPTHER

## 2023-11-01 RX ADMIN — NICOTINE 1 PATCH: 14 PATCH, EXTENDED RELEASE TRANSDERMAL at 08:11

## 2023-11-01 RX ADMIN — SERTRALINE 100 MG: 100 TABLET, FILM COATED ORAL at 08:11

## 2023-11-01 RX ADMIN — TRAZODONE HYDROCHLORIDE 200 MG: 100 TABLET ORAL at 08:11

## 2023-11-01 RX ADMIN — BUPROPION HYDROCHLORIDE 300 MG: 300 TABLET, FILM COATED, EXTENDED RELEASE ORAL at 08:11

## 2023-11-01 NOTE — PROGRESS NOTES
"   11/01/23 1435   [facility] Group Therapy   Group Name Therapeutic Recreation   Specific Interventions Cognitive Stimulation Training  (increase cognitive functioning and promote enjoyment)   Participation Level Appropriate;Attentive   Participation Quality Cooperative;Social   Insight/Motivation Good   Affect/Mood Display Appropriate   Cognition Alert   Psychomotor WNL     Patient presents calm, cooperative, motivated to participate, reports a "good" mood. Patient shows interest, remains involved and enjoys the activity.  "

## 2023-11-01 NOTE — PROGRESS NOTES
"   11/01/23 1015   [facility] [place] Group Therapy   Group Name Leisure Skills Training   Specific Interventions Cognitive Stimulation Training  (brain teaser activity to boost brain power, mental exercise and enhance problem solving)   Participation Level Appropriate;Attentive;Sharing   Participation Quality Cooperative;Social   Insight/Motivation Applies New Skills;Good   Affect/Mood Display Appropriate   Cognition Alert   Psychomotor WNL     Patient presents less anxious, motivated to participate, shows interest and friendly competition, reports a "better, hopeful" mood and states he feels better mentally and physically.   "

## 2023-11-01 NOTE — PROGRESS NOTES
"PSYCHIATRY DAILY INPATIENT PROGRESS NOTE  SUBSEQUENT HOSPITAL VISIT    ENCOUNTER DATE: 11/1/2023  [facility]    DATE OF ADMISSION: 10/23/2023  3:23 AM  LENGTH OF STAY: 9 days      CHIEF COMPLAINT   Josh Tucker is a 48 y.o. male, seen during daily persaud rounds on the inpatient unit.  Josh Tucker presented with the chief complaint of  depression/addiction/SI, "I was having suicidal thoughts."      The patient was seen and examined. The chart was reviewed.     Reviewed notes from Rns, CTRS, Speciality services, and RD and labs from the last 24 hours.    The patient's case was discussed with the treatment team/care providers today including Rns and Speciality services    Staff reports no behavioral or management issues.     The patient has been compliant with treatment.      Subjective 11/01/2023       Today, he again reports that he continues  making steady progress- he notes improving symptoms of depression/anxiety; sleep is steadily improving  His discussed his stressors in detail- he feels less overwhelmed and better able to cope  He denied withdrawals or cravings  He is no longer considering rehab; he would like to go to sober living when stable. He will be stable for discharge tomorrow if further improved.   He is trying to locate his vehicle and find a safe disposition    He is more hopeful and future oriented; he discussed his short and long term goals.     The patient denies any side effects to medications.      Psychiatric ROS (observed, reported, or endorsed/denied):  Depressed mood - improving steadily  Interest/pleasure/anhedonia: improving steadily  Guilt/hopelessness/worthlessness - improving steadily  Changes in Sleep - improving steadily  Changes in Appetite - improving steadily  Changes in Concentration - improving steadily  Changes in Energy - improving steadily  PMA/R- improving steadily  Suicidal- active/passive ideations - improving steadily  Homicidal ideations: " active/passive ideations - No    Hallucinations - No  Delusions - No  Disorganized behavior - No  Disorganized speech - No  Negative symptoms - No    Elevated mood - No  Decreased need for sleep - No  Grandiosity - No  Racing thoughts - No  Impulsivity - No  Irritability- No  Increased energy - No  Distractibility - No  Increase in goal-directed activity or PMA- No    Symptoms of NATE - improving steadily  Symptoms of Panic Disorder- No  Symptoms of PTSD - No        Overall progress: Patient is showing moderate improvement      Psychotherapy:  Target symptoms: depression, anxiety , substance abuse  Why chosen therapy is appropriate versus another modality: relevant to diagnosis, patient responds to this modality, evidence based practice  Outcome monitoring methods: self-report, observation  Therapeutic intervention type: insight oriented psychotherapy, behavior modifying psychotherapy, supportive psychotherapy, interactive psychotherapy  Topics discussed/themes: building skills sets for symptom management, symptom recognition, substance abuse  The patient's response to the intervention is accepting. The patient's progress toward treatment goals is fair.   Duration of intervention: 16 minutes      Medical ROS  General ROS: negative  Ophthalmic ROS: negative  ENT ROS: negative  Allergy and Immunology ROS: negative  Hematological and Lymphatic ROS: negative  Endocrine ROS: negative  Respiratory ROS: no cough, shortness of breath, or wheezing  Cardiovascular ROS: no chest pain or dyspnea on exertion  Gastrointestinal ROS: no abdominal pain, change in bowel habits, or black or bloody stools  Genito-Urinary ROS: no dysuria, trouble voiding, or hematuria  Musculoskeletal ROS: negative  Neurological ROS: no TIA or stroke symptoms  Dermatological ROS: negative      PAST MEDICAL HISTORY   No past medical history on file.        PSYCHOTROPIC MEDICATIONS   Scheduled Meds:   buPROPion  300 mg Oral Daily    nicotine  1 patch  Transdermal Daily    sertraline  100 mg Oral Daily    traZODone  200 mg Oral QHS     Continuous Infusions:  PRN Meds:.hydrOXYzine HCL, melatonin, OLANZapine **AND** OLANZapine        EXAMINATION    VITALS   Vitals:    10/31/23 0800 10/31/23 2000 11/01/23 0759 11/01/23 0854   BP: 105/64 139/86 109/70    BP Location: Left arm Right arm Right arm    Patient Position: Lying  Sitting    Pulse: 72 64 68    Resp: 18 18 18    Temp: 98.2 °F (36.8 °C) 97.5 °F (36.4 °C) 97.8 °F (36.6 °C)    TempSrc: Temporal Temporal Temporal    SpO2:       Weight:    78.8 kg (173 lb 11.6 oz)   Height:           Body mass index is 24.93 kg/m².      CONSTITUTIONAL  General Appearance: unremarkable, age appropriate, older than stated age, disheveled     MUSCULOSKELETAL  Muscle Strength and Tone:no dyskinesia, no dystonia, no tremor, no tic  Abnormal Involuntary Movements: No  Gait and Station: non-ataxic     PSYCHIATRIC   Level of Consciousness: awake and alert   Orientation: person, place, time, and situation  Grooming: Casually dressed and Disheveled  Psychomotor Behavior: normal, cooperative, psychomotor retardation, eye contact normal  Speech: normal tone, normal rate, normal pitch, soft, spontaneous  Language: grossly intact, able to name, able to repeat  Mood: less anxious   Affect: less Anxious, Consistent with mood, Congruent with thought, and less Constricted  Thought Process: linear, logical  Associations: intact   Thought Content: no SI, denies HI, and no delusions  Perceptions: denies AH and denies  VH  Memory: Able to recall past events, Remote intact, and Recent intact  Attention:Attends to interview without distraction  Fund of Knowledge: Aware of current events and Vocabulary appropriate   Estimate if Intelligence:  Average based on work/education history, vocabulary and mental status exam  Insight: intact, has awareness of illness- fair  Judgment: behavior is adequate to circumstances- fair    DIAGNOSTIC TESTING   Laboratory  Results  No results found for this or any previous visit (from the past 24 hour(s)).          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     Nicotine Dependence  Amphetamine use disorder, severe, continuous with physiological dependence      Psychosocial stressors        PROBLEM LIST AND MANAGEMENT PLANS     Depression: pt counseled  -start trial of Wellbutrin  mg po q day- increase to/continue at 300 mg po q day  -start adjunctive Zoloft at 25 mg po q day- increase to 50 mg po q day  -increase to 75 mg PO qd 10/28- increase to/continue at 100 mg po q day  - continue trazodone 100 mg PO qhs --> Titrate to 150mg PO nightly on 10/28- increase to/continue at 200 mg po q HS     Anxiety: pt counseled  -Zoloft as above  -vistaril prn     Insomnia: pt counseled  -vistaril prn     Nicotine Dependence: pt counseled  -started/continue  nicotine 14 mg patch dermal q day     Amphetamine use disorder: pt counseled  -rehab if willing- pt declined     Psychosocial stressors: pt counseled  -SW consulted to assist with resources          Discussed diagnosis, risks and benefits of proposed treatment vs alternative treatments vs no treatment, potential side effects of these treatments and the inherent unpredictability of treatment. The patient expresses understanding of the above and displays the capacity to agree with this treatment given said understanding. Patient also agrees that, currently, the benefits outweigh the risks and would like to pursue/continue treatment at this time.    Any medications being used off-label were discussed with the patient inclusive of the evidence base for the use of the medications and consent was obtained for the off-label use of the medication.       DISCHARGE PLANNING  Expected Disposition Plan: Home or Self Care/rehab-  patient will be stable for discharge on Thursday  (tomorrow)      NEED FOR CONTINUED HOSPITALIZATION  Psychiatric illness continues to pose a potential threat to life or bodily function, of self or others, thereby requiring the need for continued inpatient psychiatric hospitalization: Yes, due to: danger to self, gravely disabled, and suicidal ideation, as evidenced by:  Concerns with SI--Fading. and Ongoing concerns with grave disability with patient unable to perform basic feeding, hygiene and dressing activities without significant constant support.    Protective inpatient pyschiatric hospitalization required while a safe disposition plan is enacted: Yes    Patient stabilized and ready for discharge from inpatient psychiatric unit: No        STAFF:   Austyn Méndez MD  Psychiatry

## 2023-11-01 NOTE — MEDICAL/APP STUDENT
"PSYCHIATRY DAILY INPATIENT PROGRESS NOTE  SUBSEQUENT HOSPITAL VISIT    ENCOUNTER DATE: 11/1/2023  [facility]    DATE OF ADMISSION: 10/23/2023  3:23 AM  LENGTH OF STAY: 9 days      CHIEF COMPLAINT   Josh Tucker is a 48 y.o. male, seen during daily persaud rounds on the inpatient unit.  Josh Tucker presented with the chief complaint of  depression/addiction/SI, "I was having suicidal thoughts."      The patient was seen and examined. The chart was reviewed.     Reviewed notes from Rns, MD, and CTRS and labs from the last 24 hours.    The patient's case was discussed with the treatment team/care providers today including Rns    Staff reports no behavioral or management issues.     The patient has been compliant with treatment.      Subjective 11/01/2023       Today the patient reports "I feel good." Patient describes that he has been sleeping a "little better." He continues to have stress about "trying to find all these things" in reference to his search for his truck. Patient states "I plan to leave tomorrow." Patient described intention to leave today, but due to transportation issues he hopes to be able to leave tomorrow. Patient denies cravings or withdrawals and states intention to stay in a sober living house.    The patient denies any side effects to medications.        Interim/overnight events per report/notes:    Per RN note 11/1/2023 at 5:32:  "Apparently asleep. Slept 7 hours thus far with one interruption."      Psychiatric ROS (observed, reported, or endorsed/denied):  Depressed mood - improving steadily  Interest/pleasure/anhedonia: improving steadily  Guilt/hopelessness/worthlessness - improving steadily  Changes in Sleep - improving steadily  Changes in Appetite - improving steadily  Changes in Concentration - improving steadily  Changes in Energy - improving steadily  PMA/R- improving steadily  Suicidal- active/passive ideations - improving steadily  Homicidal ideations: " active/passive ideations - No    Hallucinations - No  Delusions - No  Disorganized behavior - No  Disorganized speech - No  Negative symptoms - No    Elevated mood - No  Decreased need for sleep - No  Grandiosity - No  Racing thoughts - No  Impulsivity - No  Irritability- No  Increased energy - No  Distractibility - No  Increase in goal-directed activity or PMA- No    Symptoms of NATE - improving steadily  Symptoms of Panic Disorder- No  Symptoms of PTSD - No        Overall progress: Patient is showing moderate improvement        Psychotherapy:  Target symptoms: {PSY TARGET SYMPTOMS:22199}  Why chosen therapy is appropriate versus another modality: {reason:48811}  Outcome monitoring methods: {methods:37640}  Therapeutic intervention type: {types:19027}  Topics discussed/themes: {Topics:15420}  The patient's response to the intervention is {PSY INTERVENTION RESPONSE:04387}. The patient's progress toward treatment goals is {Progress:20262}.   Duration of intervention: *** minutes.        Medical ROS  ROS      PAST MEDICAL HISTORY   No past medical history on file.        PSYCHOTROPIC MEDICATIONS   Scheduled Meds:   buPROPion  300 mg Oral Daily    nicotine  1 patch Transdermal Daily    sertraline  100 mg Oral Daily    traZODone  200 mg Oral QHS     Continuous Infusions:  PRN Meds:.hydrOXYzine HCL, melatonin, OLANZapine **AND** OLANZapine        EXAMINATION    VITALS   Vitals:    10/31/23 0800 10/31/23 2000 11/01/23 0759 11/01/23 0854   BP: 105/64 139/86 109/70    BP Location: Left arm Right arm Right arm    Patient Position: Lying  Sitting    Pulse: 72 64 68    Resp: 18 18 18    Temp: 98.2 °F (36.8 °C) 97.5 °F (36.4 °C) 97.8 °F (36.6 °C)    TempSrc: Temporal Temporal Temporal    SpO2:       Weight:    78.8 kg (173 lb 11.6 oz)   Height:           Body mass index is 24.93 kg/m².        CONSTITUTIONAL  General Appearance: unremarkable, age appropriate, older than stated age, disheveled    MUSCULOSKELETAL  Muscle Strength  "and Tone:no dyskinesia, no dystonia, no tremor, no tic  Abnormal Involuntary Movements: No  Gait and Station: non-ataxic    PSYCHIATRIC   Level of Consciousness: awake and alert   Orientation: person, place, time, and situation  Grooming: Casually dressed and Disheveled  Psychomotor Behavior: normal, cooperative, psychomotor retardation  Speech: normal tone, normal rate, normal pitch, normal volume  Language: grossly intact, able to name, able to repeat  Mood: "good"  Affect: Consistent with mood and Congruent with thought  Thought Process: linear, logical  Associations: intact   Thought Content: denies SI and denies HI  Perceptions: denies AH and denies  VH  Memory: Able to recall past events, Remote intact, and Recent intact  Attention:Attends to interview without distraction  Fund of Knowledge: Aware of current events and Vocabulary appropriate   Estimate if Intelligence:  Average based on work/education history, vocabulary and mental status exam  Insight: has awareness of illness  Judgment: behavior is adequate to circumstances        DIAGNOSTIC TESTING   Laboratory Results  No results found for this or any previous visit (from the past 24 hour(s)).          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    Nicotine dependence  Amphetamine use disorder, severe, continuous with physiological dependence    Psychosocial stressors        PROBLEM LIST AND MANAGEMENT PLANS    Depression: pt counseled  - Continue Wellbutrin  mg po q day  - continue Zoloft at 100 mg po q day  - continue trazadone 200 mg po q HS    Anxiety: pt counseled  - Zoloft as above  - vistaril prn    Insomnia: pt counseled  - vistaril prn    Nicotine dependence: pt counseled  - continue nicotine 14 mg patch dermal q day    Amphetamine use disorder: pt counseeled  - SW consulted to assist with resources            Discussed " diagnosis, risks and benefits of proposed treatment vs alternative treatments vs no treatment, potential side effects of these treatments and the inherent unpredictability of treatment. The patient expresses understanding of the above and displays the capacity to agree with this treatment given said understanding. Patient also agrees that, currently, the benefits outweigh the risks and would like to pursue/continue treatment at this time.    Any medications being used off-label were discussed with the patient inclusive of the evidence base for the use of the medications and consent was obtained for the off-label use of the medication.       DISCHARGE PLANNING  Expected Disposition Plan: Home or Self Care      NEED FOR CONTINUED HOSPITALIZATION  Psychiatric illness continues to pose a potential threat to life or bodily function, of self or others, thereby requiring the need for continued inpatient psychiatric hospitalization: Yes, due to: danger to self, gravely disabled, and suicidal ideation, as evidenced by:  Concerns with SI--Fading. and Ongoing concerns with grave disability with patient unable to perform basic feeding, hygiene and dressing activities without significant constant support.    Protective inpatient pyschiatric hospitalization required while a safe disposition plan is enacted: Yes    Patient stabilized and ready for discharge from inpatient psychiatric unit: No        STAFF:   Psychiatry

## 2023-11-01 NOTE — PLAN OF CARE
"Patient guarded and withdrawn. Patient mostly isolates in his room. Encouraged this patient to increase interacting with peers and staff. Depressed mood and affect. Patient reports on a scale of 1-10 with ten being the highest level of depression. Patient rating depression today a 5/10. On the same scale patient rating anxiety a 5/10. Encouraged patient to attend groups and activities.  Patient denies SI/HI no self harming behavior displayed, no aggressive behavior displayed towards others. Patient contracted safety with staff and unit.  Discussed healthy coping skills and techniques.   Encouraged patient to live a drug free life style.  Encouraged this patient to seek substance abuse rehab to gain insight into illness and to gain coping skills to help facilitate soberity.  Patient refusing substance abuse rehab at this time. Reports " I have to go find my truck and camper before I go to rehab". Educated, reviewed  and discussed plan of care and medication regiment with this patient. Discussed and educated with this patient any concerns that needed to be addressed along with the importance of medication compliance  1:1 with this patient. Patient voices understanding of all teachings.    "

## 2023-11-02 VITALS
OXYGEN SATURATION: 97 % | RESPIRATION RATE: 16 BRPM | HEART RATE: 78 BPM | HEIGHT: 70 IN | WEIGHT: 173.75 LBS | BODY MASS INDEX: 24.87 KG/M2 | SYSTOLIC BLOOD PRESSURE: 106 MMHG | DIASTOLIC BLOOD PRESSURE: 69 MMHG | TEMPERATURE: 97 F

## 2023-11-02 PROCEDURE — 25000003 PHARM REV CODE 250: Performed by: PSYCHIATRY & NEUROLOGY

## 2023-11-02 PROCEDURE — 99239 HOSP IP/OBS DSCHRG MGMT >30: CPT | Mod: ,,, | Performed by: PSYCHIATRY & NEUROLOGY

## 2023-11-02 PROCEDURE — 90833 PSYTX W PT W E/M 30 MIN: CPT | Mod: ,,, | Performed by: PSYCHIATRY & NEUROLOGY

## 2023-11-02 PROCEDURE — 25000003 PHARM REV CODE 250: Performed by: STUDENT IN AN ORGANIZED HEALTH CARE EDUCATION/TRAINING PROGRAM

## 2023-11-02 PROCEDURE — 99239 PR HOSPITAL DISCHARGE DAY,>30 MIN: ICD-10-PCS | Mod: ,,, | Performed by: PSYCHIATRY & NEUROLOGY

## 2023-11-02 PROCEDURE — 90833 PR PSYCHOTHERAPY W/PATIENT W/E&M, 30 MIN (ADD ON): ICD-10-PCS | Mod: ,,, | Performed by: PSYCHIATRY & NEUROLOGY

## 2023-11-02 PROCEDURE — S4991 NICOTINE PATCH NONLEGEND: HCPCS | Performed by: PSYCHIATRY & NEUROLOGY

## 2023-11-02 RX ORDER — TRAZODONE HYDROCHLORIDE 100 MG/1
200 TABLET ORAL NIGHTLY
Qty: 60 TABLET | Refills: 1 | Status: ON HOLD | OUTPATIENT
Start: 2023-11-02 | End: 2024-02-15

## 2023-11-02 RX ORDER — BUPROPION HYDROCHLORIDE 300 MG/1
300 TABLET ORAL DAILY
Qty: 30 TABLET | Refills: 1 | Status: ON HOLD | OUTPATIENT
Start: 2023-11-02 | End: 2024-02-15

## 2023-11-02 RX ORDER — SERTRALINE HYDROCHLORIDE 100 MG/1
100 TABLET, FILM COATED ORAL DAILY
Qty: 30 TABLET | Refills: 1 | Status: ON HOLD | OUTPATIENT
Start: 2023-11-02 | End: 2024-02-15

## 2023-11-02 RX ORDER — HYDROXYZINE HYDROCHLORIDE 50 MG/1
50 TABLET, FILM COATED ORAL EVERY 6 HOURS PRN
Qty: 30 TABLET | Refills: 1 | Status: ON HOLD | OUTPATIENT
Start: 2023-11-02 | End: 2024-02-15

## 2023-11-02 RX ORDER — TALC
6 POWDER (GRAM) TOPICAL NIGHTLY
Qty: 60 TABLET | Refills: 1 | Status: ON HOLD | OUTPATIENT
Start: 2023-11-02 | End: 2024-02-15

## 2023-11-02 RX ADMIN — SERTRALINE 100 MG: 100 TABLET, FILM COATED ORAL at 08:11

## 2023-11-02 RX ADMIN — BUPROPION HYDROCHLORIDE 300 MG: 300 TABLET, FILM COATED, EXTENDED RELEASE ORAL at 08:11

## 2023-11-02 RX ADMIN — NICOTINE 1 PATCH: 14 PATCH, EXTENDED RELEASE TRANSDERMAL at 08:11

## 2023-11-02 NOTE — PROGRESS NOTES
Psychotherapy:  Target symptoms: depression, anxiety , substance abuse  Why chosen therapy is appropriate versus another modality: relevant to diagnosis, patient responds to this modality, evidence based practice  Outcome monitoring methods: self-report, observation  Therapeutic intervention type: insight oriented psychotherapy, behavior modifying psychotherapy, supportive psychotherapy, interactive psychotherapy  Topics discussed/themes: building skills sets for symptom management, symptom recognition, substance abuse  Safety planning and wrap up session  The patient's response to the intervention is accepting. The patient's progress toward treatment goals is fair.   Duration of intervention: 16 minutes    Austyn Méndez MD  Psychiatry

## 2023-11-02 NOTE — NURSING
Medication prescriptions were sent to [facility] # [id number]   [address] at [facility] .
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
## 2023-11-02 NOTE — PLAN OF CARE
"Denies suicidal/homicidal thoughts.  Calm and cooperative.  Said he's looking forward to discharge tomorrow.  States mood "good".  Affect restricted.  Pleasant and polite.  Noted in dayroom watching TV.  Accepted hs snack.  Accepted hs meds.  Stressed compliance with meds upon discharge.     "

## 2024-02-04 ENCOUNTER — HOSPITAL ENCOUNTER (INPATIENT)
Facility: HOSPITAL | Age: 49
LOS: 2 days | Discharge: PSYCHIATRIC OR SUBSTANCE USE FACILITY | DRG: 918 | End: 2024-02-06
Attending: EMERGENCY MEDICINE | Admitting: HOSPITALIST

## 2024-02-04 DIAGNOSIS — R44.0 AUDITORY HALLUCINATIONS: ICD-10-CM

## 2024-02-04 DIAGNOSIS — T39.1X2A INTENTIONAL ACETAMINOPHEN OVERDOSE, INITIAL ENCOUNTER: ICD-10-CM

## 2024-02-04 DIAGNOSIS — F15.10 METHAMPHETAMINE USE: ICD-10-CM

## 2024-02-04 DIAGNOSIS — T14.91XA SUICIDAL BEHAVIOR WITH ATTEMPTED SELF-INJURY: Primary | ICD-10-CM

## 2024-02-04 DIAGNOSIS — R07.9 CHEST PAIN: ICD-10-CM

## 2024-02-04 LAB
ALBUMIN SERPL BCP-MCNC: 4 G/DL (ref 3.5–5.2)
ALP SERPL-CCNC: 127 U/L (ref 55–135)
ALT SERPL W/O P-5'-P-CCNC: 34 U/L (ref 10–44)
AMPHET+METHAMPHET UR QL: ABNORMAL
ANION GAP SERPL CALC-SCNC: 11 MMOL/L (ref 8–16)
APAP SERPL-MCNC: 90 UG/ML (ref 10–20)
AST SERPL-CCNC: 32 U/L (ref 10–40)
BARBITURATES UR QL SCN>200 NG/ML: NEGATIVE
BASOPHILS # BLD AUTO: 0.02 K/UL (ref 0–0.2)
BASOPHILS NFR BLD: 0.3 % (ref 0–1.9)
BENZODIAZ UR QL SCN>200 NG/ML: NEGATIVE
BILIRUB SERPL-MCNC: 0.9 MG/DL (ref 0.1–1)
BILIRUB UR QL STRIP: NEGATIVE
BUN SERPL-MCNC: 8 MG/DL (ref 6–20)
BZE UR QL SCN: NEGATIVE
CALCIUM SERPL-MCNC: 9.4 MG/DL (ref 8.7–10.5)
CANNABINOIDS UR QL SCN: NEGATIVE
CHLORIDE SERPL-SCNC: 104 MMOL/L (ref 95–110)
CLARITY UR: CLEAR
CO2 SERPL-SCNC: 25 MMOL/L (ref 23–29)
COLOR UR: YELLOW
CREAT SERPL-MCNC: 1.1 MG/DL (ref 0.5–1.4)
CREAT UR-MCNC: 79.2 MG/DL (ref 23–375)
DIFFERENTIAL METHOD BLD: NORMAL
EOSINOPHIL # BLD AUTO: 0 K/UL (ref 0–0.5)
EOSINOPHIL NFR BLD: 0.4 % (ref 0–8)
ERYTHROCYTE [DISTWIDTH] IN BLOOD BY AUTOMATED COUNT: 13.1 % (ref 11.5–14.5)
EST. GFR  (NO RACE VARIABLE): >60 ML/MIN/1.73 M^2
ETHANOL SERPL-MCNC: <10 MG/DL
GLUCOSE SERPL-MCNC: 99 MG/DL (ref 70–110)
GLUCOSE UR QL STRIP: NEGATIVE
HCT VFR BLD AUTO: 45.9 % (ref 40–54)
HCV AB SERPL QL IA: NEGATIVE
HEP C VIRUS HOLD SPECIMEN: NORMAL
HGB BLD-MCNC: 15.6 G/DL (ref 14–18)
HGB UR QL STRIP: NEGATIVE
HIV 1+2 AB+HIV1 P24 AG SERPL QL IA: NEGATIVE
IMM GRANULOCYTES # BLD AUTO: 0.02 K/UL (ref 0–0.04)
IMM GRANULOCYTES NFR BLD AUTO: 0.3 % (ref 0–0.5)
KETONES UR QL STRIP: ABNORMAL
LEUKOCYTE ESTERASE UR QL STRIP: NEGATIVE
LYMPHOCYTES # BLD AUTO: 1.6 K/UL (ref 1–4.8)
LYMPHOCYTES NFR BLD: 20.5 % (ref 18–48)
MCH RBC QN AUTO: 28.3 PG (ref 27–31)
MCHC RBC AUTO-ENTMCNC: 34 G/DL (ref 32–36)
MCV RBC AUTO: 83 FL (ref 82–98)
METHADONE UR QL SCN>300 NG/ML: NEGATIVE
MONOCYTES # BLD AUTO: 0.6 K/UL (ref 0.3–1)
MONOCYTES NFR BLD: 7.8 % (ref 4–15)
NEUTROPHILS # BLD AUTO: 5.4 K/UL (ref 1.8–7.7)
NEUTROPHILS NFR BLD: 70.7 % (ref 38–73)
NITRITE UR QL STRIP: NEGATIVE
NRBC BLD-RTO: 0 /100 WBC
OPIATES UR QL SCN: NEGATIVE
PCP UR QL SCN>25 NG/ML: NEGATIVE
PH UR STRIP: 6 [PH] (ref 5–8)
PLATELET # BLD AUTO: 249 K/UL (ref 150–450)
PMV BLD AUTO: 9.8 FL (ref 9.2–12.9)
POTASSIUM SERPL-SCNC: 3.9 MMOL/L (ref 3.5–5.1)
PROT SERPL-MCNC: 7.1 G/DL (ref 6–8.4)
PROT UR QL STRIP: NEGATIVE
RBC # BLD AUTO: 5.51 M/UL (ref 4.6–6.2)
SALICYLATES SERPL-MCNC: <5 MG/DL (ref 15–30)
SODIUM SERPL-SCNC: 140 MMOL/L (ref 136–145)
SP GR UR STRIP: 1.01 (ref 1–1.03)
TOXICOLOGY INFORMATION: ABNORMAL
TSH SERPL DL<=0.005 MIU/L-ACNC: 2.9 UIU/ML (ref 0.4–4)
URN SPEC COLLECT METH UR: ABNORMAL
UROBILINOGEN UR STRIP-ACNC: NEGATIVE EU/DL
WBC # BLD AUTO: 7.61 K/UL (ref 3.9–12.7)

## 2024-02-04 PROCEDURE — 99215 OFFICE O/P EST HI 40 MIN: CPT | Mod: 95,,, | Performed by: PSYCHIATRY & NEUROLOGY

## 2024-02-04 PROCEDURE — 80307 DRUG TEST PRSMV CHEM ANLYZR: CPT | Performed by: EMERGENCY MEDICINE

## 2024-02-04 PROCEDURE — 99285 EMERGENCY DEPT VISIT HI MDM: CPT

## 2024-02-04 PROCEDURE — 63600175 PHARM REV CODE 636 W HCPCS: Performed by: EMERGENCY MEDICINE

## 2024-02-04 PROCEDURE — 80053 COMPREHEN METABOLIC PANEL: CPT | Performed by: EMERGENCY MEDICINE

## 2024-02-04 PROCEDURE — 86803 HEPATITIS C AB TEST: CPT | Performed by: EMERGENCY MEDICINE

## 2024-02-04 PROCEDURE — 82077 ASSAY SPEC XCP UR&BREATH IA: CPT | Performed by: EMERGENCY MEDICINE

## 2024-02-04 PROCEDURE — 81003 URINALYSIS AUTO W/O SCOPE: CPT | Mod: 59 | Performed by: EMERGENCY MEDICINE

## 2024-02-04 PROCEDURE — 80143 DRUG ASSAY ACETAMINOPHEN: CPT | Performed by: EMERGENCY MEDICINE

## 2024-02-04 PROCEDURE — 80179 DRUG ASSAY SALICYLATE: CPT | Performed by: EMERGENCY MEDICINE

## 2024-02-04 PROCEDURE — 11000001 HC ACUTE MED/SURG PRIVATE ROOM

## 2024-02-04 PROCEDURE — 87389 HIV-1 AG W/HIV-1&-2 AB AG IA: CPT | Performed by: EMERGENCY MEDICINE

## 2024-02-04 PROCEDURE — 84443 ASSAY THYROID STIM HORMONE: CPT | Performed by: EMERGENCY MEDICINE

## 2024-02-04 PROCEDURE — 85025 COMPLETE CBC W/AUTO DIFF WBC: CPT | Performed by: EMERGENCY MEDICINE

## 2024-02-04 PROCEDURE — 25000003 PHARM REV CODE 250: Performed by: EMERGENCY MEDICINE

## 2024-02-04 RX ORDER — QUETIAPINE FUMARATE 50 MG/1
50 TABLET, FILM COATED ORAL NIGHTLY
Status: ON HOLD | COMMUNITY
End: 2024-02-15

## 2024-02-04 RX ADMIN — ACETYLCYSTEINE 11900 MG: 200 INJECTION, SOLUTION INTRAVENOUS at 11:02

## 2024-02-04 NOTE — Clinical Note
Diagnosis: Suicidal behavior with attempted self-injury [[id number]]   Future Attending Provider: RUSLAN CABAN [[id number]]   Reason for IP Medical Treatment  (Clinical interventions that can only be accomplished in the IP setting? ) :: tylenol toxicity   I certify that Inpatient services for greater than or equal to 2 midnights are medically necessary:: Yes   Plans for Post-Acute care--if anticipated (pick the single best option):: A. No post acute care anticipated at this time

## 2024-02-05 PROBLEM — F31.9 BIPOLAR DISORDER, UNSPECIFIED: Status: ACTIVE | Noted: 2024-02-05

## 2024-02-05 PROBLEM — F20.9 SCHIZOPHRENIA: Status: ACTIVE | Noted: 2024-02-05

## 2024-02-05 LAB
ALBUMIN SERPL BCP-MCNC: 3.3 G/DL (ref 3.5–5.2)
ALP SERPL-CCNC: 110 U/L (ref 55–135)
ALT SERPL W/O P-5'-P-CCNC: 34 U/L (ref 10–44)
ANION GAP SERPL CALC-SCNC: 10 MMOL/L (ref 8–16)
APAP SERPL-MCNC: <3 UG/ML (ref 10–20)
AST SERPL-CCNC: 25 U/L (ref 10–40)
BASOPHILS # BLD AUTO: 0.03 K/UL (ref 0–0.2)
BASOPHILS NFR BLD: 0.5 % (ref 0–1.9)
BILIRUB SERPL-MCNC: 0.9 MG/DL (ref 0.1–1)
BUN SERPL-MCNC: 9 MG/DL (ref 6–20)
CALCIUM SERPL-MCNC: 9 MG/DL (ref 8.7–10.5)
CHLORIDE SERPL-SCNC: 103 MMOL/L (ref 95–110)
CO2 SERPL-SCNC: 26 MMOL/L (ref 23–29)
CREAT SERPL-MCNC: 1.1 MG/DL (ref 0.5–1.4)
DIFFERENTIAL METHOD BLD: NORMAL
EOSINOPHIL # BLD AUTO: 0.1 K/UL (ref 0–0.5)
EOSINOPHIL NFR BLD: 1.5 % (ref 0–8)
ERYTHROCYTE [DISTWIDTH] IN BLOOD BY AUTOMATED COUNT: 13.2 % (ref 11.5–14.5)
EST. GFR  (NO RACE VARIABLE): >60 ML/MIN/1.73 M^2
GLUCOSE SERPL-MCNC: 105 MG/DL (ref 70–110)
HCT VFR BLD AUTO: 43.3 % (ref 40–54)
HGB BLD-MCNC: 14.6 G/DL (ref 14–18)
IMM GRANULOCYTES # BLD AUTO: 0.01 K/UL (ref 0–0.04)
IMM GRANULOCYTES NFR BLD AUTO: 0.2 % (ref 0–0.5)
INR PPP: 1.2 (ref 0.8–1.2)
LYMPHOCYTES # BLD AUTO: 2.1 K/UL (ref 1–4.8)
LYMPHOCYTES NFR BLD: 31.9 % (ref 18–48)
MCH RBC QN AUTO: 28.1 PG (ref 27–31)
MCHC RBC AUTO-ENTMCNC: 33.7 G/DL (ref 32–36)
MCV RBC AUTO: 83 FL (ref 82–98)
MONOCYTES # BLD AUTO: 0.6 K/UL (ref 0.3–1)
MONOCYTES NFR BLD: 9.7 % (ref 4–15)
NEUTROPHILS # BLD AUTO: 3.7 K/UL (ref 1.8–7.7)
NEUTROPHILS NFR BLD: 56.2 % (ref 38–73)
NRBC BLD-RTO: 0 /100 WBC
PLATELET # BLD AUTO: 232 K/UL (ref 150–450)
PMV BLD AUTO: 9.9 FL (ref 9.2–12.9)
POTASSIUM SERPL-SCNC: 3.3 MMOL/L (ref 3.5–5.1)
PROT SERPL-MCNC: 6.2 G/DL (ref 6–8.4)
PROTHROMBIN TIME: 12.4 SEC (ref 9–12.5)
RBC # BLD AUTO: 5.2 M/UL (ref 4.6–6.2)
SALICYLATES SERPL-MCNC: <5 MG/DL (ref 15–30)
SODIUM SERPL-SCNC: 139 MMOL/L (ref 136–145)
WBC # BLD AUTO: 6.59 K/UL (ref 3.9–12.7)

## 2024-02-05 PROCEDURE — 25000003 PHARM REV CODE 250: Performed by: EMERGENCY MEDICINE

## 2024-02-05 PROCEDURE — 80179 DRUG ASSAY SALICYLATE: CPT | Performed by: STUDENT IN AN ORGANIZED HEALTH CARE EDUCATION/TRAINING PROGRAM

## 2024-02-05 PROCEDURE — 80143 DRUG ASSAY ACETAMINOPHEN: CPT | Performed by: STUDENT IN AN ORGANIZED HEALTH CARE EDUCATION/TRAINING PROGRAM

## 2024-02-05 PROCEDURE — 63600175 PHARM REV CODE 636 W HCPCS: Performed by: HOSPITALIST

## 2024-02-05 PROCEDURE — 63600175 PHARM REV CODE 636 W HCPCS: Performed by: EMERGENCY MEDICINE

## 2024-02-05 PROCEDURE — 99900035 HC TECH TIME PER 15 MIN (STAT)

## 2024-02-05 PROCEDURE — 11000001 HC ACUTE MED/SURG PRIVATE ROOM

## 2024-02-05 PROCEDURE — 21400001 HC TELEMETRY ROOM

## 2024-02-05 PROCEDURE — 85610 PROTHROMBIN TIME: CPT | Performed by: STUDENT IN AN ORGANIZED HEALTH CARE EDUCATION/TRAINING PROGRAM

## 2024-02-05 PROCEDURE — 85025 COMPLETE CBC W/AUTO DIFF WBC: CPT | Performed by: HOSPITALIST

## 2024-02-05 PROCEDURE — 80053 COMPREHEN METABOLIC PANEL: CPT | Performed by: HOSPITALIST

## 2024-02-05 PROCEDURE — 36415 COLL VENOUS BLD VENIPUNCTURE: CPT | Performed by: STUDENT IN AN ORGANIZED HEALTH CARE EDUCATION/TRAINING PROGRAM

## 2024-02-05 RX ORDER — ZIPRASIDONE HYDROCHLORIDE 20 MG/1
40 CAPSULE ORAL EVERY 6 HOURS PRN
Status: DISCONTINUED | OUTPATIENT
Start: 2024-02-05 | End: 2024-02-06 | Stop reason: HOSPADM

## 2024-02-05 RX ORDER — GLUCAGON 1 MG
1 KIT INJECTION
Status: DISCONTINUED | OUTPATIENT
Start: 2024-02-05 | End: 2024-02-06 | Stop reason: HOSPADM

## 2024-02-05 RX ORDER — IPRATROPIUM BROMIDE AND ALBUTEROL SULFATE 2.5; .5 MG/3ML; MG/3ML
3 SOLUTION RESPIRATORY (INHALATION) EVERY 6 HOURS PRN
Status: DISCONTINUED | OUTPATIENT
Start: 2024-02-05 | End: 2024-02-06 | Stop reason: HOSPADM

## 2024-02-05 RX ORDER — IBUPROFEN 200 MG
16 TABLET ORAL
Status: DISCONTINUED | OUTPATIENT
Start: 2024-02-05 | End: 2024-02-06 | Stop reason: HOSPADM

## 2024-02-05 RX ORDER — ACETAMINOPHEN 325 MG/1
650 TABLET ORAL EVERY 8 HOURS PRN
Status: DISCONTINUED | OUTPATIENT
Start: 2024-02-05 | End: 2024-02-06 | Stop reason: HOSPADM

## 2024-02-05 RX ORDER — ALUMINUM HYDROXIDE, MAGNESIUM HYDROXIDE, AND SIMETHICONE 1200; 120; 1200 MG/30ML; MG/30ML; MG/30ML
30 SUSPENSION ORAL 4 TIMES DAILY PRN
Status: DISCONTINUED | OUTPATIENT
Start: 2024-02-05 | End: 2024-02-06 | Stop reason: HOSPADM

## 2024-02-05 RX ORDER — ZIPRASIDONE MESYLATE 20 MG/ML
40 INJECTION, POWDER, LYOPHILIZED, FOR SOLUTION INTRAMUSCULAR EVERY 6 HOURS PRN
Status: DISCONTINUED | OUTPATIENT
Start: 2024-02-05 | End: 2024-02-06 | Stop reason: HOSPADM

## 2024-02-05 RX ORDER — IBUPROFEN 200 MG
24 TABLET ORAL
Status: DISCONTINUED | OUTPATIENT
Start: 2024-02-05 | End: 2024-02-06 | Stop reason: HOSPADM

## 2024-02-05 RX ORDER — PROMETHAZINE HYDROCHLORIDE 25 MG/1
25 TABLET ORAL EVERY 6 HOURS PRN
Status: DISCONTINUED | OUTPATIENT
Start: 2024-02-05 | End: 2024-02-05

## 2024-02-05 RX ORDER — AMOXICILLIN 250 MG
1 CAPSULE ORAL 2 TIMES DAILY PRN
Status: DISCONTINUED | OUTPATIENT
Start: 2024-02-05 | End: 2024-02-06 | Stop reason: HOSPADM

## 2024-02-05 RX ORDER — SODIUM CHLORIDE, SODIUM LACTATE, POTASSIUM CHLORIDE, CALCIUM CHLORIDE 600; 310; 30; 20 MG/100ML; MG/100ML; MG/100ML; MG/100ML
INJECTION, SOLUTION INTRAVENOUS CONTINUOUS
Status: DISCONTINUED | OUTPATIENT
Start: 2024-02-05 | End: 2024-02-06 | Stop reason: HOSPADM

## 2024-02-05 RX ORDER — SODIUM CHLORIDE 0.9 % (FLUSH) 0.9 %
10 SYRINGE (ML) INJECTION EVERY 12 HOURS PRN
Status: DISCONTINUED | OUTPATIENT
Start: 2024-02-05 | End: 2024-02-06 | Stop reason: HOSPADM

## 2024-02-05 RX ORDER — ONDANSETRON HYDROCHLORIDE 2 MG/ML
4 INJECTION, SOLUTION INTRAVENOUS EVERY 8 HOURS PRN
Status: DISCONTINUED | OUTPATIENT
Start: 2024-02-05 | End: 2024-02-05

## 2024-02-05 RX ORDER — ENOXAPARIN SODIUM 100 MG/ML
40 INJECTION SUBCUTANEOUS EVERY 24 HOURS
Status: DISCONTINUED | OUTPATIENT
Start: 2024-02-05 | End: 2024-02-06 | Stop reason: HOSPADM

## 2024-02-05 RX ORDER — NALOXONE HCL 0.4 MG/ML
0.02 VIAL (ML) INJECTION
Status: DISCONTINUED | OUTPATIENT
Start: 2024-02-05 | End: 2024-02-06 | Stop reason: HOSPADM

## 2024-02-05 RX ADMIN — SODIUM CHLORIDE, POTASSIUM CHLORIDE, SODIUM LACTATE AND CALCIUM CHLORIDE: 600; 310; 30; 20 INJECTION, SOLUTION INTRAVENOUS at 01:02

## 2024-02-05 RX ADMIN — ENOXAPARIN SODIUM 40 MG: 40 INJECTION SUBCUTANEOUS at 05:02

## 2024-02-05 RX ADMIN — ONDANSETRON 4 MG: 2 INJECTION INTRAMUSCULAR; INTRAVENOUS at 12:02

## 2024-02-05 RX ADMIN — ACETYLCYSTEINE 4000 MG: 200 INJECTION, SOLUTION INTRAVENOUS at 12:02

## 2024-02-05 RX ADMIN — ACETYLCYSTEINE 7900 MG: 200 INJECTION, SOLUTION INTRAVENOUS at 05:02

## 2024-02-05 RX ADMIN — SODIUM CHLORIDE, POTASSIUM CHLORIDE, SODIUM LACTATE AND CALCIUM CHLORIDE: 600; 310; 30; 20 INJECTION, SOLUTION INTRAVENOUS at 08:02

## 2024-02-05 NOTE — H&P
"  [facility] - Emergency Dept.  [facility]  History & Physical    Patient Name: Josh Tucker  MRN: [id number]  Patient Class: IP- Inpatient  Admission Date: 2/4/2024  Attending Physician: Napoleon Love MD   Primary Care Provider: Lesa Primary Doctor         Patient information was obtained from patient, past medical records, and ER records.     Subjective:     Principal Problem:Suicidal behavior with attempted self-injury    Chief Complaint:   Chief Complaint   Patient presents with    Drug Overdose     SI. Pt took Meth yesterday and 20 tylenol today trying to attempt suicide.     Psychiatric Evaluation        HPI: Josh Tucker is a 48 y.o. male with a PMH  has a past medical history of Bipolar disorder, unspecified, Schizophrenia, unspecified, and Substance abuse. who presented to the ED via EMS for further evaluation of attempted suicide.  Patient reportedly was experiencing suicidal ideations following augment with his sister as well as having auditory hallucinations followed by taking approximately 20 extra-strength Tylenol to end his life.  He reports hearing voices "saying hateful stuff" and feels people are watching him.  Patient reported using methamphetamine yesterday prior to taking the Tylenol and is still endorsing both suicidal and homicidal ideations.  He reports he is not currently on any psychiatric medications outpatient and not currently following psychiatry outpatient.  Patient denied endorsing any other concerns or complaints at time of bedside assessment and reported being in his usual state of health prior to onset of symptoms.  Initial workup in the ED fairly unremarkable with the exception of acetaminophen level measuring 90 and urine toxicology positive for amphetamines.  Patient evaluated by tele psychiatry with recommendations to place patient under PEC followed by inpatient psychiatric admission following medical clearance.  Patient initiated on NAC and admitted " to Hospital Medicine inpatient for continued medical management.    PCP: No, Primary Doctor      Past Medical History:   Diagnosis Date    Bipolar disorder, unspecified     Schizophrenia, unspecified     Substance abuse        Past Surgical History:   Procedure Laterality Date    STRABISMUS SURGERY Left        Review of patient's allergies indicates:  No Known Allergies    No current facility-administered medications on file prior to encounter.     Current Outpatient Medications on File Prior to Encounter   Medication Sig    buPROPion (WELLBUTRIN XL) 300 MG 24 hr tablet Take 1 tablet (300 mg total) by mouth once daily.    hydrOXYzine (ATARAX) 50 MG tablet Take 1 tablet (50 mg total) by mouth every 6 (six) hours as needed for Anxiety or Itching.    melatonin (MELATIN) 3 mg tablet Take 2 tablets (6 mg total) by mouth nightly.    QUEtiapine (SEROQUEL) 50 MG tablet Take 50 mg by mouth nightly.    sertraline (ZOLOFT) 100 MG tablet Take 1 tablet (100 mg total) by mouth once daily.    traZODone (DESYREL) 100 MG tablet Take 2 tablets (200 mg total) by mouth every evening.     Family History    None       Tobacco Use    Smoking status: Every Day     Current packs/day: 1.00     Types: Cigarettes    Smokeless tobacco: Current     Types: Snuff   Substance and Sexual Activity    Alcohol use: Yes     Alcohol/week: 6.0 standard drinks of alcohol     Types: 6 Cans of beer per week    Drug use: Yes     Types: Methamphetamines    Sexual activity: Not on file     Review of Systems   All other systems reviewed and are negative.    Objective:     Vital Signs (Most Recent):  Temp: 98 °F (36.7 °C) (02/04/24 2350)  Pulse: 92 (02/04/24 2350)  Resp: 18 (02/04/24 2350)  BP: (!) 124/94 (02/04/24 2350)  SpO2: 100 % (02/04/24 2350) Vital Signs (24h Range):  Temp:  [97.7 °F (36.5 °C)-98 °F (36.7 °C)] 98 °F (36.7 °C)  Pulse:  [92-95] 92  Resp:  [18-20] 18  SpO2:  [99 %-100 %] 100 %  BP: (124-142)/() 124/94     Weight: 79 kg (174 lb 2.6  oz)  Body mass index is 24.99 kg/m².     Physical Exam  Vitals reviewed.   Constitutional:       General: He is not in acute distress.     Appearance: Normal appearance. He is normal weight. He is not ill-appearing, toxic-appearing or diaphoretic.   HENT:      Head: Normocephalic and atraumatic.      Right Ear: External ear normal.      Left Ear: External ear normal.      Nose: Nose normal. No congestion or rhinorrhea.      Mouth/Throat:      Mouth: Mucous membranes are moist.      Pharynx: Oropharynx is clear. No oropharyngeal exudate or posterior oropharyngeal erythema.   Eyes:      General: No scleral icterus.     Extraocular Movements: Extraocular movements intact.      Conjunctiva/sclera: Conjunctivae normal.      Pupils: Pupils are equal, round, and reactive to light.   Neck:      Vascular: No carotid bruit.   Cardiovascular:      Rate and Rhythm: Normal rate and regular rhythm.      Pulses: Normal pulses.      Heart sounds: Normal heart sounds. No murmur heard.     No friction rub. No gallop.   Pulmonary:      Effort: Pulmonary effort is normal. No respiratory distress.      Breath sounds: Normal breath sounds. No stridor. No wheezing, rhonchi or rales.   Chest:      Chest wall: No tenderness.   Abdominal:      General: Abdomen is flat. Bowel sounds are normal. There is no distension.      Palpations: Abdomen is soft. There is no mass.      Tenderness: There is no abdominal tenderness. There is no guarding or rebound.      Hernia: No hernia is present.   Musculoskeletal:         General: No swelling, tenderness, deformity or signs of injury. Normal range of motion.      Cervical back: Normal range of motion and neck supple. No rigidity or tenderness.   Lymphadenopathy:      Cervical: No cervical adenopathy.   Skin:     General: Skin is warm and dry.      Capillary Refill: Capillary refill takes less than 2 seconds.      Coloration: Skin is not jaundiced or pale.      Findings: No bruising, erythema, lesion or  rash.   Neurological:      General: No focal deficit present.      Mental Status: He is alert and oriented to person, place, and time. Mental status is at baseline.      Cranial Nerves: No cranial nerve deficit.      Sensory: No sensory deficit.      Motor: No weakness.      Coordination: Coordination normal.   Psychiatric:      Comments: Patient with flat affect with active suicidal ideations as well as auditory hallucinations.               CRANIAL NERVES     CN III, IV, VI   Pupils are equal, round, and reactive to light.       Significant Labs: All pertinent labs within the past 24 hours have been reviewed.    Significant Imaging: I have reviewed all pertinent imaging results/findings within the past 24 hours.    LABS:  Recent Results (from the past 24 hour(s))   Urinalysis, Reflex to Urine Culture Urine, Clean Catch    Collection Time: 02/04/24  9:27 PM    Specimen: Urine   Result Value Ref Range    Specimen UA Urine, Clean Catch     Color, UA Yellow Yellow, Straw, Tish    Appearance, UA Clear Clear    pH, UA 6.0 5.0 - 8.0    Specific Gravity, UA 1.010 1.005 - 1.030    Protein, UA Negative Negative    Glucose, UA Negative Negative    Ketones, UA Trace (A) Negative    Bilirubin (UA) Negative Negative    Occult Blood UA Negative Negative    Nitrite, UA Negative Negative    Urobilinogen, UA Negative <2.0 EU/dL    Leukocytes, UA Negative Negative   Drug screen panel, emergency    Collection Time: 02/04/24  9:27 PM   Result Value Ref Range    Benzodiazepines Negative Negative    Methadone metabolites Negative Negative    Cocaine (Metab.) Negative Negative    Opiate Scrn, Ur Negative Negative    Barbiturate Screen, Ur Negative Negative    Amphetamine Screen, Ur Presumptive Positive (A) Negative    THC Negative Negative    Phencyclidine Negative Negative    Creatinine, Urine 79.2 23.0 - 375.0 mg/dL    Toxicology Information SEE COMMENT    HIV 1/2 Ag/Ab (4th Gen)    Collection Time: 02/04/24  9:55 PM   Result Value  Ref Range    HIV 1/2 Ag/Ab Negative Negative   Hepatitis C Antibody    Collection Time: 02/04/24  9:55 PM   Result Value Ref Range    Hepatitis C Ab Negative Negative   HCV Virus Hold Specimen    Collection Time: 02/04/24  9:55 PM   Result Value Ref Range    HEP C Virus Hold Specimen Hold for HCV sendout    Ethanol    Collection Time: 02/04/24  9:55 PM   Result Value Ref Range    Alcohol, Serum <10 <10 mg/dL   Acetaminophen level    Collection Time: 02/04/24  9:55 PM   Result Value Ref Range    Acetaminophen (Tylenol), Serum 90.0 (H) 10.0 - 20.0 ug/mL   Comprehensive metabolic panel    Collection Time: 02/04/24  9:55 PM   Result Value Ref Range    Sodium 140 136 - 145 mmol/L    Potassium 3.9 3.5 - 5.1 mmol/L    Chloride 104 95 - 110 mmol/L    CO2 25 23 - 29 mmol/L    Glucose 99 70 - 110 mg/dL    BUN 8 6 - 20 mg/dL    Creatinine 1.1 0.5 - 1.4 mg/dL    Calcium 9.4 8.7 - 10.5 mg/dL    Total Protein 7.1 6.0 - 8.4 g/dL    Albumin 4.0 3.5 - 5.2 g/dL    Total Bilirubin 0.9 0.1 - 1.0 mg/dL    Alkaline Phosphatase 127 55 - 135 U/L    AST 32 10 - 40 U/L    ALT 34 10 - 44 U/L    eGFR >60 >60 mL/min/1.73 m^2    Anion Gap 11 8 - 16 mmol/L   TSH    Collection Time: 02/04/24  9:55 PM   Result Value Ref Range    TSH 2.898 0.400 - 4.000 uIU/mL   CBC auto differential    Collection Time: 02/04/24  9:55 PM   Result Value Ref Range    WBC 7.61 3.90 - 12.70 K/uL    RBC 5.51 4.60 - 6.20 M/uL    Hemoglobin 15.6 14.0 - 18.0 g/dL    Hematocrit 45.9 40.0 - 54.0 %    MCV 83 82 - 98 fL    MCH 28.3 27.0 - 31.0 pg    MCHC 34.0 32.0 - 36.0 g/dL    RDW 13.1 11.5 - 14.5 %    Platelets 249 150 - 450 K/uL    MPV 9.8 9.2 - 12.9 fL    Immature Granulocytes 0.3 0.0 - 0.5 %    Gran # (ANC) 5.4 1.8 - 7.7 K/uL    Immature Grans (Abs) 0.02 0.00 - 0.04 K/uL    Lymph # 1.6 1.0 - 4.8 K/uL    Mono # 0.6 0.3 - 1.0 K/uL    Eos # 0.0 0.0 - 0.5 K/uL    Baso # 0.02 0.00 - 0.20 K/uL    nRBC 0 0 /100 WBC    Gran % 70.7 38.0 - 73.0 %    Lymph % 20.5 18.0 - 48.0 %     Mono % 7.8 4.0 - 15.0 %    Eosinophil % 0.4 0.0 - 8.0 %    Basophil % 0.3 0.0 - 1.9 %    Differential Method Automated    Salicylate level    Collection Time: 02/04/24  9:55 PM   Result Value Ref Range    Salicylate Lvl <5.0 (L) 15.0 - 30.0 mg/dL       RADIOLOGY  No results found.    EKG    MICROBIOLOGY    MDM    Assessment/Plan:     * Suicidal behavior with attempted self-injury    Intentional acetaminophen overdose    Schizophrenia    Auditory hallucinations    Bipolar disorder, unspecified  Patient with significant psychiatric history including bipolar, anxiety, depression, and schizophrenia presented with attempted suicide via adjusted multiple on Tylenol pills.  Patient is still endorsing suicidal ideations as well as paranoia and auditory hallucinations.  ED workup fairly unremarkable with the exception of acetaminophen level measuring 90 and urine toxicology positive for methamphetamines.  Patient remains hemodynamically stable and currently under PEC.  Patient evaluated by telepsychiatry with recommendations for inpatient psychiatric hospitalization following medical clearance.  Patient placed on Geodon 40 mg p.o./IM q.6 hours p.r.n. for non redirectable agitation, kwadwo, breakthrough psychosis. Patient also started on NAC treatment for Tylenol overdose and admitted to Hospital Medicine.  Plan:  -telemetry  -continue NAC treatment  -trend Tylenol levels  -IVFs  -continue sitter  -continue PEC  -continue p.r.n Geodon  -f/u psychiatry for inpatient psych placement      Methamphetamine use  Patient last reported methamphetamines on Friday and currently admitted for suicidal ideations as well as auditory hallucinations.  Plan:  -continue treatment as noted above  -monitor for signs/symptoms of withdrawals, treat accordingly        VTE Risk Mitigation (From admission, onward)           Ordered     enoxaparin injection 40 mg  Daily         02/05/24 0045     IP VTE LOW RISK PATIENT  Once         02/05/24 0045      Place sequential compression device  Until discontinued         02/05/24 0045                  //Core Measures   -DVT proph: SCDs, Lovenox   -Code status: Full    -Surrogate: none provided       Components of this note were documented using a voice recognition system and are subject to errors not corrected at the time the document was proof read. Please contact the author for any clarifications.        Napoleon Love MD  Department of Hospital Medicine  [facility] - Emergency Dept.

## 2024-02-05 NOTE — SUBJECTIVE & OBJECTIVE
Review of Systems  Objective:     Vital Signs (Most Recent):  Temp: 99.2 °F (37.3 °C) (02/05/24 1422)  Pulse: 76 (02/05/24 1422)  Resp: 16 (02/05/24 1422)  BP: (!) 141/90 (02/05/24 1422)  SpO2: 97 % (02/05/24 1422) Vital Signs (24h Range):  Temp:  [97.7 °F (36.5 °C)-99.2 °F (37.3 °C)] 99.2 °F (37.3 °C)  Pulse:  [72-95] 76  Resp:  [14-20] 16  SpO2:  [96 %-100 %] 97 %  BP: (121-142)/() 141/90     Weight: 79 kg (174 lb 2.6 oz)  Body mass index is 24.99 kg/m².    Intake/Output Summary (Last 24 hours) at 2/5/2024 1522  Last data filed at 2/5/2024 1325  Gross per 24 hour   Intake 450 ml   Output 650 ml   Net -200 ml         Physical Exam      Constitutional:       General: He is not in acute distress.     Appearance: Normal appearance. He is normal weight. He is not ill-appearing, toxic-appearing or diaphoretic.   HENT:      Head: Normocephalic and atraumatic.      Right Ear: External ear normal.      Left Ear: External ear normal.      Nose: Nose normal. No congestion or rhinorrhea.      Mouth/Throat:      Mouth: Mucous membranes are moist.      Pharynx: Oropharynx is clear. No oropharyngeal exudate or posterior oropharyngeal erythema.   Eyes:      General: No scleral icterus.     Extraocular Movements: Extraocular movements intact.      Conjunctiva/sclera: Conjunctivae normal.      Pupils: Pupils are equal, round, and reactive to light.   Neck:      Vascular: No carotid bruit.   Cardiovascular:      Rate and Rhythm: Normal rate and regular rhythm.      Pulses: Normal pulses.      Heart sounds: Normal heart sounds. No murmur heard.     No friction rub. No gallop.   Pulmonary:      Effort: Pulmonary effort is normal. No respiratory distress.      Breath sounds: Normal breath sounds. No stridor. No wheezing, rhonchi or rales.   Chest:      Chest wall: No tenderness.   Abdominal:      General: Abdomen is flat. Bowel sounds are normal. There is no distension.      Palpations: Abdomen is soft. There is no mass.       Tenderness: There is no abdominal tenderness. There is no guarding or rebound.      Hernia: No hernia is present.   Musculoskeletal:         General: No swelling, tenderness, deformity or signs of injury. Normal range of motion.      Cervical back: Normal range of motion and neck supple. No rigidity or tenderness.   Lymphadenopathy:      Cervical: No cervical adenopathy.   Skin:     General: Skin is warm and dry.      Capillary Refill: Capillary refill takes less than 2 seconds.      Coloration: Skin is not jaundiced or pale.      Findings: No bruising, erythema, lesion or rash.   Neurological:      General: No focal deficit present.      Mental Status: He is alert and oriented to person, place, and time. Mental status is at baseline.      Cranial Nerves: No cranial nerve deficit.      Sensory: No sensory deficit.      Motor: No weakness.      Coordination: Coordination normal.   Psychiatric:      Comments: Patient with flat affect with active suicidal ideations as well as auditory hallucinations.       Significant Labs: All pertinent labs within the past 24 hours have been reviewed.  CBC:   Recent Labs   Lab 02/04/24 2155 02/05/24  1329   WBC 7.61 6.59   HGB 15.6 14.6   HCT 45.9 43.3    232     CMP:   Recent Labs   Lab 02/04/24  2155 02/05/24  1329    139   K 3.9 3.3*    103   CO2 25 26   GLU 99 105   BUN 8 9   CREATININE 1.1 1.1   CALCIUM 9.4 9.0   PROT 7.1 6.2   ALBUMIN 4.0 3.3*   BILITOT 0.9 0.9   ALKPHOS 127 110   AST 32 25   ALT 34 34   ANIONGAP 11 10       Significant Imaging:     Imaging Results    None

## 2024-02-05 NOTE — ED NOTES
Spoke to Poison Control. States gather CMP, acetaminophen level, and tox screen. Watch for GI issues and administer antiemetics as needed. Observation time 4 hours from ingestion.

## 2024-02-05 NOTE — ED PROVIDER NOTES
"SCRIBE #1 NOTE: I, Ellie Elliott, am scribing for, and in the presence of, Brian Gaona Jr., MD. I have scribed the entire note.       History     Chief Complaint   Patient presents with    Drug Overdose     SI. Pt took Meth yesterday and 20 tylenol today trying to attempt suicide.     Psychiatric Evaluation     Review of patient's allergies indicates:  No Known Allergies      History of Present Illness     HPI    2/4/2024, 9:15 PM  History obtained from the patient      History of Present Illness: Josh Tucker is a 48 y.o. male patient who presents to the Emergency Department via ambulance service for psychiatric evaluation. The patient states that he had an argument with family and is now experiencing SI and auditory hallucination. He reports that he took 20 extra strength Tylenol pills last night to attempt suicide. He states that the voices have been "saying hateful stuff". He does not take any psychiatric medication. He additionally reports using meth yesterday prior to taking the pills. He denies alcohol use. Symptoms are constant and moderate in severity. No mitigating or exacerbating factors reported. Patient denies any HI, fever, CP, SOB, N/V, and all other sxs at this time. No further complaints or concerns at this time.     Arrival mode: Ambulance Service     PCP: Lesa, Primary Doctor        Past Medical History:  Past Medical History:   Diagnosis Date    Bipolar disorder, unspecified     Schizophrenia, unspecified     Substance abuse        Past Surgical History:  Past Surgical History:   Procedure Laterality Date    STRABISMUS SURGERY Left          Family History:  History reviewed. No pertinent family history.    Social History:  Social History     Tobacco Use    Smoking status: Every Day     Current packs/day: 1.00     Types: Cigarettes    Smokeless tobacco: Current     Types: Snuff   Substance and Sexual Activity    Alcohol use: Yes     Alcohol/week: 6.0 standard drinks of alcohol     Types: 6 " Cans of beer per week    Drug use: Yes     Types: Methamphetamines    Sexual activity: Not on file        Review of Systems     Review of Systems   Constitutional:  Negative for fever.   HENT:  Negative for sore throat.    Respiratory:  Negative for shortness of breath.    Cardiovascular:  Negative for chest pain.   Gastrointestinal:  Negative for nausea and vomiting.   Genitourinary:  Negative for dysuria.   Musculoskeletal:  Negative for back pain.   Skin:  Negative for rash.   Neurological:  Negative for weakness.   Hematological:  Does not bruise/bleed easily.   Psychiatric/Behavioral:  Positive for hallucinations (auditory), self-injury and suicidal ideas.         [-] homicidal ideas   All other systems reviewed and are negative.     Physical Exam     Initial Vitals [02/04/24 2053]   BP Pulse Resp Temp SpO2   (!) 142/102 95 20 97.7 °F (36.5 °C) 99 %      MAP       --          Physical Exam  Nursing Notes and Vital Signs Reviewed.  Constitutional: Patient is in no acute distress. Well-developed and well-nourished.  Head: Atraumatic. Normocephalic.  Eyes: PERRL. EOM intact. Conjunctivae are not pale. No scleral icterus.  ENT: Mucous membranes are moist. Oropharynx is clear and symmetric.    Neck: Supple. Full ROM. No lymphadenopathy.  Cardiovascular: Regular rate. Regular rhythm. No murmurs, rubs, or gallops. Distal pulses are 2+ and symmetric.  Pulmonary/Chest: No respiratory distress. Clear to auscultation bilaterally. No wheezing or rales.  Abdominal: Soft and non-distended.  There is no tenderness.  No rebound, guarding, or rigidity. Good bowel sounds.  Genitourinary: No CVA tenderness  Musculoskeletal: Moves all extremities. No obvious deformities. No edema. No calf tenderness.  Skin: Warm and dry.  Neurological:  Alert, awake, and appropriate.  Normal speech.  No acute focal neurological deficits are appreciated.  Psychiatric:               Behavior: cooperative              Mood and Affect: flat affect      "         Thought Process: blocked              Suicidal Ideations: Yes              Suicidal Plan: Specific plan to harm self. Attempt by medication overdose.               Homicidal Ideations: No              Hallucinations: auditory     ED Course   Critical Care    Date/Time: 2/4/2024 10:53 PM    Performed by: Brian Gaona Jr., MD  Authorized by: Brian Gaona Jr., MD  Direct patient critical care time: 31 minutes  Ordering / reviewing critical care time: 20 minutes  Documentation critical care time: 11 minutes  Consulting other physicians critical care time: 13 minutes  Total critical care time (exclusive of procedural time) : 75 minutes  Critical care time was exclusive of separately billable procedures and treating other patients and teaching time.  Critical care was necessary to treat or prevent imminent or life-threatening deterioration of the following conditions: Overdose.  Critical care was time spent personally by me on the following activities: blood draw for specimens, development of treatment plan with patient or surrogate, discussions with consultants, interpretation of cardiac output measurements, evaluation of patient's response to treatment, examination of patient, obtaining history from patient or surrogate, ordering and performing treatments and interventions, ordering and review of laboratory studies, ordering and review of radiographic studies, pulse oximetry, re-evaluation of patient's condition and review of old charts.        ED Vital Signs:  Vitals:    02/04/24 2053 02/04/24 2350 02/05/24 0043 02/05/24 0354   BP: (!) 142/102 (!) 124/94 128/87    Pulse: 95 92 87 81   Resp: 20 18 18 16   Temp: 97.7 °F (36.5 °C) 98 °F (36.7 °C) 98.3 °F (36.8 °C)    TempSrc: Oral Oral Oral    SpO2: 99% 100% 100% 99%   Weight: 79 kg (174 lb 2.6 oz)      Height: 5' 10" (1.778 m)       02/05/24 0703 02/05/24 0802 02/05/24 0902 02/05/24 1002   BP: (!) 133/97 (!) 125/96 (!) 137/95 127/83   Pulse: 72 74 82 81 "   Resp: 19 16 17 15   Temp:       TempSrc:       SpO2: 98% 98% 98% 98%   Weight:       Height:        02/05/24 1102 02/05/24 1202 02/05/24 1422 02/05/24 1500   BP: 121/84 122/76 (!) 141/90    Pulse: 78 74 76 74   Resp: 17 14 16    Temp:  98.7 °F (37.1 °C) 99.2 °F (37.3 °C)    TempSrc:  Oral     SpO2: 97% 96% 97%    Weight:       Height:        02/05/24 2000 02/05/24 2001   BP:  129/83   Pulse: 74 78   Resp:  18   Temp:  98.3 °F (36.8 °C)   TempSrc:     SpO2:  97%   Weight:     Height:         Abnormal Lab Results:  Labs Reviewed   URINALYSIS, REFLEX TO URINE CULTURE - Abnormal; Notable for the following components:       Result Value    Ketones, UA Trace (*)     All other components within normal limits    Narrative:     Specimen Source->Urine   ACETAMINOPHEN LEVEL - Abnormal; Notable for the following components:    Acetaminophen (Tylenol), Serum 90.0 (*)     All other components within normal limits    Narrative:     Release to patient->Immediate   DRUG SCREEN PANEL, URINE EMERGENCY - Abnormal; Notable for the following components:    Amphetamine Screen, Ur Presumptive Positive (*)     All other components within normal limits    Narrative:     Specimen Source->Urine   SALICYLATE LEVEL - Abnormal; Notable for the following components:    Salicylate Lvl <5.0 (*)     All other components within normal limits    Narrative:     Release to patient->Immediate   COMPREHENSIVE METABOLIC PANEL - Abnormal; Notable for the following components:    Potassium 3.3 (*)     Albumin 3.3 (*)     All other components within normal limits   HIV 1 / 2 ANTIBODY    Narrative:     Release to patient->Immediate   HEPATITIS C ANTIBODY    Narrative:     Release to patient->Immediate   HEP C VIRUS HOLD SPECIMEN    Narrative:     Release to patient->Immediate   ALCOHOL,MEDICAL (ETHANOL)    Narrative:     Release to patient->Immediate   COMPREHENSIVE METABOLIC PANEL    Narrative:     Release to patient->Immediate   TSH    Narrative:      Release to patient->Immediate   CBC W/ AUTO DIFFERENTIAL    Narrative:     Release to patient->Immediate   CBC W/ AUTO DIFFERENTIAL   PROTIME-INR        All Lab Results:  Results for orders placed or performed during the hospital encounter of 02/04/24   HIV 1/2 Ag/Ab (4th Gen)   Result Value Ref Range    HIV 1/2 Ag/Ab Negative Negative   Hepatitis C Antibody   Result Value Ref Range    Hepatitis C Ab Negative Negative   HCV Virus Hold Specimen   Result Value Ref Range    HEP C Virus Hold Specimen Hold for HCV sendout    Urinalysis, Reflex to Urine Culture Urine, Clean Catch    Specimen: Urine   Result Value Ref Range    Specimen UA Urine, Clean Catch     Color, UA Yellow Yellow, Straw, Tish    Appearance, UA Clear Clear    pH, UA 6.0 5.0 - 8.0    Specific Gravity, UA 1.010 1.005 - 1.030    Protein, UA Negative Negative    Glucose, UA Negative Negative    Ketones, UA Trace (A) Negative    Bilirubin (UA) Negative Negative    Occult Blood UA Negative Negative    Nitrite, UA Negative Negative    Urobilinogen, UA Negative <2.0 EU/dL    Leukocytes, UA Negative Negative   Ethanol   Result Value Ref Range    Alcohol, Serum <10 <10 mg/dL   Acetaminophen level   Result Value Ref Range    Acetaminophen (Tylenol), Serum 90.0 (H) 10.0 - 20.0 ug/mL   Comprehensive metabolic panel   Result Value Ref Range    Sodium 140 136 - 145 mmol/L    Potassium 3.9 3.5 - 5.1 mmol/L    Chloride 104 95 - 110 mmol/L    CO2 25 23 - 29 mmol/L    Glucose 99 70 - 110 mg/dL    BUN 8 6 - 20 mg/dL    Creatinine 1.1 0.5 - 1.4 mg/dL    Calcium 9.4 8.7 - 10.5 mg/dL    Total Protein 7.1 6.0 - 8.4 g/dL    Albumin 4.0 3.5 - 5.2 g/dL    Total Bilirubin 0.9 0.1 - 1.0 mg/dL    Alkaline Phosphatase 127 55 - 135 U/L    AST 32 10 - 40 U/L    ALT 34 10 - 44 U/L    eGFR >60 >60 mL/min/1.73 m^2    Anion Gap 11 8 - 16 mmol/L   TSH   Result Value Ref Range    TSH 2.898 0.400 - 4.000 uIU/mL   CBC auto differential   Result Value Ref Range    WBC 7.61 3.90 - 12.70 K/uL     RBC 5.51 4.60 - 6.20 M/uL    Hemoglobin 15.6 14.0 - 18.0 g/dL    Hematocrit 45.9 40.0 - 54.0 %    MCV 83 82 - 98 fL    MCH 28.3 27.0 - 31.0 pg    MCHC 34.0 32.0 - 36.0 g/dL    RDW 13.1 11.5 - 14.5 %    Platelets 249 150 - 450 K/uL    MPV 9.8 9.2 - 12.9 fL    Immature Granulocytes 0.3 0.0 - 0.5 %    Gran # (ANC) 5.4 1.8 - 7.7 K/uL    Immature Grans (Abs) 0.02 0.00 - 0.04 K/uL    Lymph # 1.6 1.0 - 4.8 K/uL    Mono # 0.6 0.3 - 1.0 K/uL    Eos # 0.0 0.0 - 0.5 K/uL    Baso # 0.02 0.00 - 0.20 K/uL    nRBC 0 0 /100 WBC    Gran % 70.7 38.0 - 73.0 %    Lymph % 20.5 18.0 - 48.0 %    Mono % 7.8 4.0 - 15.0 %    Eosinophil % 0.4 0.0 - 8.0 %    Basophil % 0.3 0.0 - 1.9 %    Differential Method Automated    Drug screen panel, emergency   Result Value Ref Range    Benzodiazepines Negative Negative    Methadone metabolites Negative Negative    Cocaine (Metab.) Negative Negative    Opiate Scrn, Ur Negative Negative    Barbiturate Screen, Ur Negative Negative    Amphetamine Screen, Ur Presumptive Positive (A) Negative    THC Negative Negative    Phencyclidine Negative Negative    Creatinine, Urine 79.2 23.0 - 375.0 mg/dL    Toxicology Information SEE COMMENT    Salicylate level   Result Value Ref Range    Salicylate Lvl <5.0 (L) 15.0 - 30.0 mg/dL   Comprehensive Metabolic Panel (CMP)   Result Value Ref Range    Sodium 139 136 - 145 mmol/L    Potassium 3.3 (L) 3.5 - 5.1 mmol/L    Chloride 103 95 - 110 mmol/L    CO2 26 23 - 29 mmol/L    Glucose 105 70 - 110 mg/dL    BUN 9 6 - 20 mg/dL    Creatinine 1.1 0.5 - 1.4 mg/dL    Calcium 9.0 8.7 - 10.5 mg/dL    Total Protein 6.2 6.0 - 8.4 g/dL    Albumin 3.3 (L) 3.5 - 5.2 g/dL    Total Bilirubin 0.9 0.1 - 1.0 mg/dL    Alkaline Phosphatase 110 55 - 135 U/L    AST 25 10 - 40 U/L    ALT 34 10 - 44 U/L    eGFR >60 >60 mL/min/1.73 m^2    Anion Gap 10 8 - 16 mmol/L   CBC with Automated Differential   Result Value Ref Range    WBC 6.59 3.90 - 12.70 K/uL    RBC 5.20 4.60 - 6.20 M/uL    Hemoglobin  14.6 14.0 - 18.0 g/dL    Hematocrit 43.3 40.0 - 54.0 %    MCV 83 82 - 98 fL    MCH 28.1 27.0 - 31.0 pg    MCHC 33.7 32.0 - 36.0 g/dL    RDW 13.2 11.5 - 14.5 %    Platelets 232 150 - 450 K/uL    MPV 9.9 9.2 - 12.9 fL    Immature Granulocytes 0.2 0.0 - 0.5 %    Gran # (ANC) 3.7 1.8 - 7.7 K/uL    Immature Grans (Abs) 0.01 0.00 - 0.04 K/uL    Lymph # 2.1 1.0 - 4.8 K/uL    Mono # 0.6 0.3 - 1.0 K/uL    Eos # 0.1 0.0 - 0.5 K/uL    Baso # 0.03 0.00 - 0.20 K/uL    nRBC 0 0 /100 WBC    Gran % 56.2 38.0 - 73.0 %    Lymph % 31.9 18.0 - 48.0 %    Mono % 9.7 4.0 - 15.0 %    Eosinophil % 1.5 0.0 - 8.0 %    Basophil % 0.5 0.0 - 1.9 %    Differential Method Automated    Salicylate level   Result Value Ref Range    Salicylate Lvl <5.0 (L) 15.0 - 30.0 mg/dL   Protime-INR   Result Value Ref Range    Prothrombin Time 12.4 9.0 - 12.5 sec    INR 1.2 0.8 - 1.2   Acetaminophen level   Result Value Ref Range    Acetaminophen (Tylenol), Serum <3.0 (L) 10.0 - 20.0 ug/mL         Imaging Results:  Imaging Results    None                   The Emergency Provider reviewed the vital signs and test results, which are outlined above.     ED Discussion     9:21 PM: The PEC hold has been issued by Dr. Gaona at this time for SI with attempt.    10:54 PM: Pankaj Clark RN: contacted poison control, who recommends giving Acetadote and repeating Tylenol level and CMC after infusion.     10:59 PM: Re-evaluated pt. The patient states that he last took an unknown amount of Tylenol yesterday evening. He estimates that he took at least 20 pills after he used meth.     11:22 PM: Discussed pt's case with Dr. Lowery (Psychiatry) who recommends proceeding with PEC and giving Geodon 40 mg q6hr IM prn for agitation.    11:24 PM: Discussed case with Napoleon Love MD (Hospital Medicine). Dr. Love agrees with current care and management of pt and accepts admission.   Admitting Service: Hospital Medicine  Admitting Physician: Dr. Love  Admit to: Inpatient  Med/Tele    11:24 PM: Re-evaluated pt. I have discussed test results, shared treatment plan, and the need for admission with patient and family at bedside. Pt and family express understanding at this time and agree with all information. All questions answered. Pt and family have no further questions or concerns at this time. Pt is ready for admit.         Medical Decision Making  Amount and/or Complexity of Data Reviewed  Labs: ordered. Decision-making details documented in ED Course.    Risk  OTC drugs.  Prescription drug management.  Parenteral controlled substances.  Drug therapy requiring intensive monitoring for toxicity.  Decision regarding hospitalization.  Risk Details: OTC drugs, prescription drugs and controlled substances considered.  Due to patient's symptoms improving and pain controlled pain medications ordered appropriately.  Ddx: SI, liver failure, electrolyte abnormality, psychosis, schizophrenia, depression  Started on acetylcysteine infusion     Critical Care  Total time providing critical care: 75 minutes                ED Medication(s):  Medications   sodium chloride 0.9% flush 10 mL (has no administration in time range)   lactated ringers infusion ( Intravenous New Bag 2/5/24 2003)   albuterol-ipratropium 2.5 mg-0.5 mg/3 mL nebulizer solution 3 mL (has no administration in time range)   senna-docusate 8.6-50 mg per tablet 1 tablet (has no administration in time range)   acetaminophen tablet 650 mg (has no administration in time range)   aluminum-magnesium hydroxide-simethicone 200-200-20 mg/5 mL suspension 30 mL (has no administration in time range)   naloxone 0.4 mg/mL injection 0.02 mg (has no administration in time range)   glucose chewable tablet 16 g (has no administration in time range)   glucose chewable tablet 24 g (has no administration in time range)   glucagon (human recombinant) injection 1 mg (has no administration in time range)   enoxaparin injection 40 mg (40 mg Subcutaneous  Given 2/5/24 1717)   dextrose 10% bolus 125 mL 125 mL (has no administration in time range)   dextrose 10% bolus 250 mL 250 mL (has no administration in time range)   ziprasidone capsule 40 mg (has no administration in time range)   ziprasidone injection 40 mg (has no administration in time range)   acetylcysteine 20% (200 mg/ml) (ACETADOTE) 11,900 mg in dextrose 5 % (D5W) 250 mL infusion (0 mg Intravenous Stopped 2/5/24 0037)   acetylcysteine 20% (200 mg/ml) (ACETADOTE) 4,000 mg in dextrose 5 % (D5W) 500 mL infusion (0 mg Intravenous Stopped 2/5/24 0510)   acetylcysteine 20% (200 mg/ml) (ACETADOTE) 7,900 mg in dextrose 5 % (D5W) 1,000 mL infusion (0 mg Intravenous Stopped 2/5/24 2112)       Current Discharge Medication List                  Scribe Attestation:   Scribe #1: I performed the above scribed service and the documentation accurately describes the services I performed. I attest to the accuracy of the note.     Attending:   Physician Attestation Statement for Scribe #1: I, Brian Gaona Jr., MD, personally performed the services described in this documentation, as scribed by Ellie Elliott, in my presence, and it is both accurate and complete.           Clinical Impression       ICD-10-CM ICD-9-CM   1. Suicidal behavior with attempted self-injury  T14.91XA 300.9   2. Auditory hallucinations  R44.0 780.1   3. Methamphetamine use  F15.10 305.70   4. Intentional acetaminophen overdose, initial encounter  T39.1X2A 965.4     E950.0   5. Chest pain  R07.9 786.50       Disposition:   Disposition: Admitted  Condition: Serious         Brian Gaona Jr., MD  02/06/24 6498

## 2024-02-05 NOTE — CONSULTS
"[facility]  Psychiatry  Telepsychiatry Consult Note    Please see previous notes:    Patient agreeable to consultation via telepsychiatry.    Tele-Consultation from Psychiatry started: 2/4/2024 at 2250  The chief complaint leading to psychiatric consultation is: suicidal ideation-overdose   This consultation was requested by Dr. Brian Gaona, the Emergency Department attending physician.  The location of the consulting psychiatrist is  [place], [place] .  The patient location is  [facility] EMERGENCY DEPARTMENT   The patient arrived at the ED at: 2056    Also present with the patient at the time of the consultation: ED staff    Patient Identification:   Josh Tucker is a 48 y.o. male.    Patient information was obtained from patient.  Patient presented voluntarily to the Emergency Department     Inpatient consult to Telemedicine - Psyc  Consult performed by: Shanon Lowery MD  Consult ordered by: Brian Gaona Jr., MD  Reason for consult: suicidal ideation-overdose        Consult Start Time: 02/04/2024 22:50 CST  Consult End Time: 02/04/2024 21:31 CST        Subjective:     History of Present Illness:  Patient is a 48yoM with past psychiatric history of major depressive disorder, unspecified anxiety, insomnia, nicotine dependence and amphetamine use disorder who presents after suicide attempt by overdose on Tylenol. Patient got into an argument with sister. Patient got a "brick and belt." He was going to have a belt attached to a fan pulley attached to a motor. He took 20 Tylenol and felt faint and dizzy; took them at 4pm yesterday. He is not certain why he took all the Tylenol. Endorses auditory hallucinations since 2016; denies VHs. Endorses paranoia about people are watching him.    Patient endorses "alright" mood and endorses ok appetite and ok sleep (worn out since the weekend). Patient denies any sober period of sleep deficit associated with grandiosity/irritability, distractibility, " "impulsivity, racing thoughts, increased activity and talkativeness. The patient denies any current or history of HI or any plan/intent to harm others. Denies VHs. No vocalized delusions.    Psychiatric History:   Previous Psychiatric Hospitalizations: Yes- 5 to 10; first around late 30's; most recently 10-11/2023  Previous SI/HI: Yes,  Previous Suicide Attempts: Yes, at 14yo, then says he did not try to hurt himself  Previous Medication Trials: Yes,  Psychiatric Care (current & past): No,current; yes on past  History of Psychotherapy: No  History of Violence: Yes,  History of sexual/physical abuse: No,  History of Depression: Yes  History of Isaura: no  History of Auditory/Visual Hallucination see HPI  History of Delusions: no vocalized delusions  Outpatient psychiatrist (current & past): No    Substance Abuse History:  Recreational Drugs: methamphetamines-most recently Friday; Lortab-Thursday around lunch  Use of Alcohol: occasional, social use (most recently at [facility])  Rehab History:yes   Tobacco Use:yes    Legal History: Past charges/incarcerations:   Past charges/incarcerations: YES: drug related     Pending charges:NO    Family Psychiatric History: denied    Social History:  Developmental/Childhood:Achieved all developmental milestones timely  Education: 10th then GED  Employment Status/Finances:Unemployed- 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 n/a  Latter-day:Spiritual without formal affiliation  Recreational activities: none    Psychiatric Mental Status Exam:  Arousal: alert  Sensorium/Orientation: oriented to grossly intact  Behavior/Cooperation: cooperative   Speech: normal tone, normal pitch, normal volume, mumbled  Language: grossly intact  Mood: " alright "   Affect: blunted  Thought Process:  disorganized  Thought Content:   Auditory " hallucinations: YES: denies CAHs     Visual hallucinations: NO  Paranoia: YES: people are going to hurt people     Delusions:  NO  Suicidal ideation: NO, but he cannot explain why he overdosed on Tylenol  Homicidal ideation: NO  Attention/Concentration:  distractible  Memory:    Recent:  Decreased   Remote: Intact  Insight: has awareness of illness  Judgment: behavior is adequate to circumstances in ED      Past Medical History:   Past Medical History:   Diagnosis Date    Bipolar disorder, unspecified     Schizophrenia, unspecified     Substance abuse       Laboratory Data:   Labs Reviewed   URINALYSIS, REFLEX TO URINE CULTURE - Abnormal; Notable for the following components:       Result Value    Ketones, UA Trace (*)     All other components within normal limits    Narrative:     Specimen Source->Urine   ACETAMINOPHEN LEVEL - Abnormal; Notable for the following components:    Acetaminophen (Tylenol), Serum 90.0 (*)     All other components within normal limits    Narrative:     Release to patient->Immediate   DRUG SCREEN PANEL, URINE EMERGENCY - Abnormal; Notable for the following components:    Amphetamine Screen, Ur Presumptive Positive (*)     All other components within normal limits    Narrative:     Specimen Source->Urine   SALICYLATE LEVEL - Abnormal; Notable for the following components:    Salicylate Lvl <5.0 (*)     All other components within normal limits    Narrative:     Release to patient->Immediate   HIV 1 / 2 ANTIBODY    Narrative:     Release to patient->Immediate   HEPATITIS C ANTIBODY    Narrative:     Release to patient->Immediate   HEP C VIRUS HOLD SPECIMEN    Narrative:     Release to patient->Immediate   ALCOHOL,MEDICAL (ETHANOL)    Narrative:     Release to patient->Immediate   COMPREHENSIVE METABOLIC PANEL    Narrative:     Release to patient->Immediate   TSH    Narrative:     Release to patient->Immediate   CBC W/ AUTO DIFFERENTIAL    Narrative:     Release to patient->Immediate        Neurological History:  Seizures: No  Head trauma: No    Allergies:  Review of patient's allergies indicates:  No Known Allergies    Medications in ER: Medications - No data to display    Medications at home: denied    Assessment - Diagnosis - Goals:     Diagnosis/Impression: Patient is a 48yoM with past psychiatric history of major depressive disorder, unspecified anxiety, insomnia, nicotine dependence and amphetamine use disorder who presents after suicide attempt by overdose on Tylenol. Patient cannot explain why he overdosed on Tylenol but said he had been in an argument with his sister. Patient endorses hearing voices. For these reasons, upon medical clearance, recommend transfer to inpatient psychiatric unit. Would not start any medications at this time due to tylenol overdose.    Rec:   1. Dispo/Legal Status:  Cont PEC at this time as the pt is currently a danger to self. Seek inpt bed for pt safety and stabilization when/if medically cleared by the ER MD.  2. Scheduled Medications: none. Defer any non-psych meds to the ER MD.   3. PRN Medications: ziprasidone 40mg po/I'm q6hr prn non-redirectable agitation associated with breakthrough psychosis or kwadwo if needed to help the pt more effectively interact with environment.  4. Precautions/Nursing:  suicide precautions  5. To-Do: Continue to observe pt's behavior while in the ER  6. Case Discussed with: Dr. Brian Gaona    Time with patient: 19 minutes  In total, 37 minutes were spent on chart review, discussion with ED, patient interview and charting    More than 50% of the time was spent counseling/coordinating care    Consulting clinician was informed of the encounter and consult note.    Consultation ended: 2/4/2024 at 2131    Shanon Lowery MD   Psychiatry  [facility]

## 2024-02-05 NOTE — ED NOTES
Patient became paranoid while starting IV and fixated on having HIV. Discussed lab results with patient multiple times and offered reassurance that HIV and HCV were negative.

## 2024-02-05 NOTE — HPI
"Josh Tucker is a 48 y.o. male with a PMH  has a past medical history of Bipolar disorder, unspecified, Schizophrenia, unspecified, and Substance abuse. who presented to the ED via EMS for further evaluation of attempted suicide.  Patient reportedly was experiencing suicidal ideations following augment with his sister as well as having auditory hallucinations followed by taking approximately 20 extra-strength Tylenol to end his life.  He reports hearing voices "saying hateful stuff" and feels people are watching him.  Patient reported using methamphetamine yesterday prior to taking the Tylenol and is still endorsing both suicidal and homicidal ideations.  He reports he is not currently on any psychiatric medications outpatient and not currently following psychiatry outpatient.  Patient denied endorsing any other concerns or complaints at time of bedside assessment and reported being in his usual state of health prior to onset of symptoms.  Initial workup in the ED fairly unremarkable with the exception of acetaminophen level measuring 90 and urine toxicology positive for amphetamines.  Patient evaluated by tele psychiatry with recommendations to place patient under PEC followed by inpatient psychiatric admission following medical clearance.  Patient initiated on NAC and admitted to Hospital Medicine inpatient for continued medical management.    PCP: No, Primary Doctor    "

## 2024-02-05 NOTE — HOSPITAL COURSE
"Josh Tucker is a 48 y.o. male with a PMH  has a past medical history of Bipolar disorder, unspecified, Schizophrenia, unspecified, and Substance abuse. who presented to the ED via EMS for further evaluation of attempted suicide.  Patient reportedly was experiencing suicidal ideations following augment with his sister as well as having auditory hallucinations followed by taking approximately 20 extra-strength Tylenol to end his life.  He reports hearing voices "saying hateful stuff" and feels people are watching him.  Patient reported using methamphetamine yesterday prior to taking the Tylenol and is still endorsing both suicidal and homicidal ideations.  He reports he is not currently on any psychiatric medications outpatient and not currently following psychiatry outpatient.  Patient denied endorsing any other concerns or complaints at time of bedside assessment and reported being in his usual state of health prior to onset of symptoms.  Initial workup in the ED fairly unremarkable with the exception of acetaminophen level measuring 90 and urine toxicology positive for amphetamines.  Patient evaluated by tele psychiatry with recommendations to place patient under PEC followed by inpatient psychiatric admission following medical clearance.  Patient initiated on NAC and admitted to Hospital Medicine inpatient for continued medical management.     On 02/05/2024, on NAC; follow up on LFTs, INR, salicylate levels, currently PEC'd per psych recommendations, patient is still expressing suicidal ideation, sitter at bedside, once NAC completed, based on clinical/lab condition plan to transfer to inpatient psych unit accordingly  2/6/24- examination of patient and bedside, appeared alert and oriented x3, still expressing suicidal ideation.  Sitter at bedside.  Currently PEC'd.  appeared clinically/medically stable, completed NAC, Tylenol level less than 5, LFTs within normal limit, INR within normal range, patient " denied nausea, vomiting, abdominal pain, tolerating diet.    Patient deemed medically stable/ cleared at this time - to get transferred to inpatient psych unit.     working on arrangements for transferring patient to inpatient psych unit.

## 2024-02-05 NOTE — ASSESSMENT & PLAN NOTE
Patient last reported methamphetamines on Friday and currently admitted for suicidal ideations as well as auditory hallucinations.  Plan:  -continue treatment as noted above  -monitor for signs/symptoms of withdrawals, treat accordingly

## 2024-02-05 NOTE — ED NOTES
Poison control updated on patient and recommendations for acetidote and repeat tylenol and cmp after infusion complete.

## 2024-02-05 NOTE — PROGRESS NOTES
"[facility] Medicine  Progress Note    Patient Name: Josh Tucker  MRN: [id number]  Patient Class: IP- Inpatient   Admission Date: 2/4/2024  Length of Stay: 1 days  Attending Physician: Padilla Roy,*  Primary Care Provider: Lesa Primary Doctor        Subjective:     Principal Problem:Suicidal behavior with attempted self-injury        HPI:  Josh Tucker is a 48 y.o. male with a PMH  has a past medical history of Bipolar disorder, unspecified, Schizophrenia, unspecified, and Substance abuse. who presented to the ED via EMS for further evaluation of attempted suicide.  Patient reportedly was experiencing suicidal ideations following augment with his sister as well as having auditory hallucinations followed by taking approximately 20 extra-strength Tylenol to end his life.  He reports hearing voices "saying hateful stuff" and feels people are watching him.  Patient reported using methamphetamine yesterday prior to taking the Tylenol and is still endorsing both suicidal and homicidal ideations.  He reports he is not currently on any psychiatric medications outpatient and not currently following psychiatry outpatient.  Patient denied endorsing any other concerns or complaints at time of bedside assessment and reported being in his usual state of health prior to onset of symptoms.  Initial workup in the ED fairly unremarkable with the exception of acetaminophen level measuring 90 and urine toxicology positive for amphetamines.  Patient evaluated by tele psychiatry with recommendations to place patient under PEC followed by inpatient psychiatric admission following medical clearance.  Patient initiated on NAC and admitted to Hospital Medicine inpatient for continued medical management.    PCP: Lesa, Primary Doctor      Overview/Hospital Course:  Josh Tucker is a 48 y.o. male with a PMH  has a past medical history of Bipolar disorder, unspecified, Schizophrenia, " "unspecified, and Substance abuse. who presented to the ED via EMS for further evaluation of attempted suicide.  Patient reportedly was experiencing suicidal ideations following augment with his sister as well as having auditory hallucinations followed by taking approximately 20 extra-strength Tylenol to end his life.  He reports hearing voices "saying hateful stuff" and feels people are watching him.  Patient reported using methamphetamine yesterday prior to taking the Tylenol and is still endorsing both suicidal and homicidal ideations.  He reports he is not currently on any psychiatric medications outpatient and not currently following psychiatry outpatient.  Patient denied endorsing any other concerns or complaints at time of bedside assessment and reported being in his usual state of health prior to onset of symptoms.  Initial workup in the ED fairly unremarkable with the exception of acetaminophen level measuring 90 and urine toxicology positive for amphetamines.  Patient evaluated by tele psychiatry with recommendations to place patient under PEC followed by inpatient psychiatric admission following medical clearance.  Patient initiated on NAC and admitted to Hospital Medicine inpatient for continued medical management.     On 02/05/2024, on NAC; follow up on LFTs, INR, salicylate levels, currently PEC'd per psych recommendations, patient is still expressing suicidal ideation, sitter at bedside, once NAC completed, based on clinical/lab condition plan to transfer to inpatient psych unit accordingly        Review of Systems  Objective:     Vital Signs (Most Recent):  Temp: 99.2 °F (37.3 °C) (02/05/24 1422)  Pulse: 76 (02/05/24 1422)  Resp: 16 (02/05/24 1422)  BP: (!) 141/90 (02/05/24 1422)  SpO2: 97 % (02/05/24 1422) Vital Signs (24h Range):  Temp:  [97.7 °F (36.5 °C)-99.2 °F (37.3 °C)] 99.2 °F (37.3 °C)  Pulse:  [72-95] 76  Resp:  [14-20] 16  SpO2:  [96 %-100 %] 97 %  BP: (121-142)/() 141/90     Weight: " 79 kg (174 lb 2.6 oz)  Body mass index is 24.99 kg/m².    Intake/Output Summary (Last 24 hours) at 2/5/2024 1522  Last data filed at 2/5/2024 1325  Gross per 24 hour   Intake 450 ml   Output 650 ml   Net -200 ml         Physical Exam      Constitutional:       General: He is not in acute distress.     Appearance: Normal appearance. He is normal weight. He is not ill-appearing, toxic-appearing or diaphoretic.   HENT:      Head: Normocephalic and atraumatic.      Right Ear: External ear normal.      Left Ear: External ear normal.      Nose: Nose normal. No congestion or rhinorrhea.      Mouth/Throat:      Mouth: Mucous membranes are moist.      Pharynx: Oropharynx is clear. No oropharyngeal exudate or posterior oropharyngeal erythema.   Eyes:      General: No scleral icterus.     Extraocular Movements: Extraocular movements intact.      Conjunctiva/sclera: Conjunctivae normal.      Pupils: Pupils are equal, round, and reactive to light.   Neck:      Vascular: No carotid bruit.   Cardiovascular:      Rate and Rhythm: Normal rate and regular rhythm.      Pulses: Normal pulses.      Heart sounds: Normal heart sounds. No murmur heard.     No friction rub. No gallop.   Pulmonary:      Effort: Pulmonary effort is normal. No respiratory distress.      Breath sounds: Normal breath sounds. No stridor. No wheezing, rhonchi or rales.   Chest:      Chest wall: No tenderness.   Abdominal:      General: Abdomen is flat. Bowel sounds are normal. There is no distension.      Palpations: Abdomen is soft. There is no mass.      Tenderness: There is no abdominal tenderness. There is no guarding or rebound.      Hernia: No hernia is present.   Musculoskeletal:         General: No swelling, tenderness, deformity or signs of injury. Normal range of motion.      Cervical back: Normal range of motion and neck supple. No rigidity or tenderness.   Lymphadenopathy:      Cervical: No cervical adenopathy.   Skin:     General: Skin is warm and  dry.      Capillary Refill: Capillary refill takes less than 2 seconds.      Coloration: Skin is not jaundiced or pale.      Findings: No bruising, erythema, lesion or rash.   Neurological:      General: No focal deficit present.      Mental Status: He is alert and oriented to person, place, and time. Mental status is at baseline.      Cranial Nerves: No cranial nerve deficit.      Sensory: No sensory deficit.      Motor: No weakness.      Coordination: Coordination normal.   Psychiatric:      Comments: Patient with flat affect with active suicidal ideations as well as auditory hallucinations.       Significant Labs: All pertinent labs within the past 24 hours have been reviewed.  CBC:   Recent Labs   Lab 02/04/24 2155 02/05/24  1329   WBC 7.61 6.59   HGB 15.6 14.6   HCT 45.9 43.3    232     CMP:   Recent Labs   Lab 02/04/24 2155 02/05/24  1329    139   K 3.9 3.3*    103   CO2 25 26   GLU 99 105   BUN 8 9   CREATININE 1.1 1.1   CALCIUM 9.4 9.0   PROT 7.1 6.2   ALBUMIN 4.0 3.3*   BILITOT 0.9 0.9   ALKPHOS 127 110   AST 32 25   ALT 34 34   ANIONGAP 11 10       Significant Imaging:     Imaging Results    None          Assessment/Plan:      * Suicidal behavior with attempted self-injury        Schizophrenia        Bipolar disorder, unspecified  Patient with significant psychiatric history including bipolar, anxiety, depression, and schizophrenia presented with attempted suicide via adjusted multiple on Tylenol pills.  Patient is still endorsing suicidal ideations as well as paranoia and auditory hallucinations.  ED workup fairly unremarkable with the exception of acetaminophen level measuring 90 and urine toxicology positive for methamphetamines.  Patient remains hemodynamically stable and currently under PEC.  Patient evaluated by telepsychiatry with recommendations for inpatient psychiatric hospitalization following medical clearance.  Patient placed on Geodon 40 mg p.o./IM q.6 hours p.r.n. for  non redirectable agitation, kwadwo, breakthrough psychosis. Patient also started on NAC treatment for Tylenol overdose and admitted to Hospital Medicine.  Plan:  -telemetry  -continue NAC treatment  -trend Tylenol levels  -IVFs  -continue sitter  -continue PEC  -continue p.r.n Geodon  -f/u psychiatry for inpatient psych placement      Auditory hallucinations        Methamphetamine use  Patient last reported methamphetamines on Friday and currently admitted for suicidal ideations as well as auditory hallucinations.  Plan:  -continue treatment as noted above  -monitor for signs/symptoms of withdrawals, treat accordingly      Intentional acetaminophen overdose          VTE Risk Mitigation (From admission, onward)           Ordered     enoxaparin injection 40 mg  Daily         02/05/24 0045     IP VTE LOW RISK PATIENT  Once         02/05/24 0045     Place sequential compression device  Until discontinued         02/05/24 0045                    Discharge Planning   BRENT:      Code Status: Full Code   Is the patient medically ready for discharge?:     Reason for patient still in hospital (select all that apply): Patient trending condition and Pending disposition                     Padilla Roy MD  Department of Hospital Medicine   [facility] - Med S[facility]

## 2024-02-05 NOTE — PHARMACY MED REC
"Admission Medication History     The home medication history was taken by Favian Chakraborty.    You may go to "Admission" then "Reconcile Home Medications" tabs to review and/or act upon these items.     The home medication list has been updated by the Pharmacy department.   Please read ALL comments highlighted in yellow.   Please address this information as you see fit.    Feel free to contact us if you have any questions or require assistance.      Medications listed below were obtained from: [employer] and Medical records  (Not in a hospital admission)    Favian Chakraborty  [id number]    Current Outpatient Medications on File Prior to Encounter   Medication Sig Dispense Refill Last Dose    buPROPion (WELLBUTRIN XL) 300 MG 24 hr tablet Take 1 tablet (300 mg total) by mouth once daily. 30 tablet 1 More than a month    hydrOXYzine (ATARAX) 50 MG tablet Take 1 tablet (50 mg total) by mouth every 6 (six) hours as needed for Anxiety or Itching. 30 tablet 1 More than a month    melatonin (MELATIN) 3 mg tablet Take 2 tablets (6 mg total) by mouth nightly. 60 tablet 1 More than a month    QUEtiapine (SEROQUEL) 50 MG tablet Take 50 mg by mouth nightly.   More than a month    sertraline (ZOLOFT) 100 MG tablet Take 1 tablet (100 mg total) by mouth once daily. 30 tablet 1 More than a month    traZODone (DESYREL) 100 MG tablet Take 2 tablets (200 mg total) by mouth every evening. 60 tablet 1 More than a month                         .          "

## 2024-02-05 NOTE — PLAN OF CARE
Discussed poc with pt, pt verbalized understanding  Pt PEC sitter at bs  Purposeful rounding every 2hours    VS wnl  Cardiac monitoring in use, pt is NSR, tele monitor # [id number]  Fall precautions in place, remains injury free  Pt denies c/o of pain and nausea     IVFs  Accurate I&Os  Bed locked at lowest position  Call light within reach    Chart check complete  Will cont with POC

## 2024-02-05 NOTE — SUBJECTIVE & OBJECTIVE
Past Medical History:   Diagnosis Date    Bipolar disorder, unspecified     Schizophrenia, unspecified     Substance abuse        Past Surgical History:   Procedure Laterality Date    STRABISMUS SURGERY Left        Review of patient's allergies indicates:  No Known Allergies    No current facility-administered medications on file prior to encounter.     Current Outpatient Medications on File Prior to Encounter   Medication Sig    buPROPion (WELLBUTRIN XL) 300 MG 24 hr tablet Take 1 tablet (300 mg total) by mouth once daily.    hydrOXYzine (ATARAX) 50 MG tablet Take 1 tablet (50 mg total) by mouth every 6 (six) hours as needed for Anxiety or Itching.    melatonin (MELATIN) 3 mg tablet Take 2 tablets (6 mg total) by mouth nightly.    QUEtiapine (SEROQUEL) 50 MG tablet Take 50 mg by mouth nightly.    sertraline (ZOLOFT) 100 MG tablet Take 1 tablet (100 mg total) by mouth once daily.    traZODone (DESYREL) 100 MG tablet Take 2 tablets (200 mg total) by mouth every evening.     Family History    None       Tobacco Use    Smoking status: Every Day     Current packs/day: 1.00     Types: Cigarettes    Smokeless tobacco: Current     Types: Snuff   Substance and Sexual Activity    Alcohol use: Yes     Alcohol/week: 6.0 standard drinks of alcohol     Types: 6 Cans of beer per week    Drug use: Yes     Types: Methamphetamines    Sexual activity: Not on file     Review of Systems   All other systems reviewed and are negative.    Objective:     Vital Signs (Most Recent):  Temp: 98 °F (36.7 °C) (02/04/24 2350)  Pulse: 92 (02/04/24 2350)  Resp: 18 (02/04/24 2350)  BP: (!) 124/94 (02/04/24 2350)  SpO2: 100 % (02/04/24 2350) Vital Signs (24h Range):  Temp:  [97.7 °F (36.5 °C)-98 °F (36.7 °C)] 98 °F (36.7 °C)  Pulse:  [92-95] 92  Resp:  [18-20] 18  SpO2:  [99 %-100 %] 100 %  BP: (124-142)/() 124/94     Weight: 79 kg (174 lb 2.6 oz)  Body mass index is 24.99 kg/m².     Physical Exam  Vitals reviewed.   Constitutional:        General: He is not in acute distress.     Appearance: Normal appearance. He is normal weight. He is not ill-appearing, toxic-appearing or diaphoretic.   HENT:      Head: Normocephalic and atraumatic.      Right Ear: External ear normal.      Left Ear: External ear normal.      Nose: Nose normal. No congestion or rhinorrhea.      Mouth/Throat:      Mouth: Mucous membranes are moist.      Pharynx: Oropharynx is clear. No oropharyngeal exudate or posterior oropharyngeal erythema.   Eyes:      General: No scleral icterus.     Extraocular Movements: Extraocular movements intact.      Conjunctiva/sclera: Conjunctivae normal.      Pupils: Pupils are equal, round, and reactive to light.   Neck:      Vascular: No carotid bruit.   Cardiovascular:      Rate and Rhythm: Normal rate and regular rhythm.      Pulses: Normal pulses.      Heart sounds: Normal heart sounds. No murmur heard.     No friction rub. No gallop.   Pulmonary:      Effort: Pulmonary effort is normal. No respiratory distress.      Breath sounds: Normal breath sounds. No stridor. No wheezing, rhonchi or rales.   Chest:      Chest wall: No tenderness.   Abdominal:      General: Abdomen is flat. Bowel sounds are normal. There is no distension.      Palpations: Abdomen is soft. There is no mass.      Tenderness: There is no abdominal tenderness. There is no guarding or rebound.      Hernia: No hernia is present.   Musculoskeletal:         General: No swelling, tenderness, deformity or signs of injury. Normal range of motion.      Cervical back: Normal range of motion and neck supple. No rigidity or tenderness.   Lymphadenopathy:      Cervical: No cervical adenopathy.   Skin:     General: Skin is warm and dry.      Capillary Refill: Capillary refill takes less than 2 seconds.      Coloration: Skin is not jaundiced or pale.      Findings: No bruising, erythema, lesion or rash.   Neurological:      General: No focal deficit present.      Mental Status: He is alert  and oriented to person, place, and time. Mental status is at baseline.      Cranial Nerves: No cranial nerve deficit.      Sensory: No sensory deficit.      Motor: No weakness.      Coordination: Coordination normal.   Psychiatric:      Comments: Patient with flat affect with active suicidal ideations as well as auditory hallucinations.               CRANIAL NERVES     CN III, IV, VI   Pupils are equal, round, and reactive to light.       Significant Labs: All pertinent labs within the past 24 hours have been reviewed.    Significant Imaging: I have reviewed all pertinent imaging results/findings within the past 24 hours.    LABS:  Recent Results (from the past 24 hour(s))   Urinalysis, Reflex to Urine Culture Urine, Clean Catch    Collection Time: 02/04/24  9:27 PM    Specimen: Urine   Result Value Ref Range    Specimen UA Urine, Clean Catch     Color, UA Yellow Yellow, Straw, Tish    Appearance, UA Clear Clear    pH, UA 6.0 5.0 - 8.0    Specific Gravity, UA 1.010 1.005 - 1.030    Protein, UA Negative Negative    Glucose, UA Negative Negative    Ketones, UA Trace (A) Negative    Bilirubin (UA) Negative Negative    Occult Blood UA Negative Negative    Nitrite, UA Negative Negative    Urobilinogen, UA Negative <2.0 EU/dL    Leukocytes, UA Negative Negative   Drug screen panel, emergency    Collection Time: 02/04/24  9:27 PM   Result Value Ref Range    Benzodiazepines Negative Negative    Methadone metabolites Negative Negative    Cocaine (Metab.) Negative Negative    Opiate Scrn, Ur Negative Negative    Barbiturate Screen, Ur Negative Negative    Amphetamine Screen, Ur Presumptive Positive (A) Negative    THC Negative Negative    Phencyclidine Negative Negative    Creatinine, Urine 79.2 23.0 - 375.0 mg/dL    Toxicology Information SEE COMMENT    HIV 1/2 Ag/Ab (4th Gen)    Collection Time: 02/04/24  9:55 PM   Result Value Ref Range    HIV 1/2 Ag/Ab Negative Negative   Hepatitis C Antibody    Collection Time: 02/04/24   9:55 PM   Result Value Ref Range    Hepatitis C Ab Negative Negative   HCV Virus Hold Specimen    Collection Time: 02/04/24  9:55 PM   Result Value Ref Range    HEP C Virus Hold Specimen Hold for HCV sendout    Ethanol    Collection Time: 02/04/24  9:55 PM   Result Value Ref Range    Alcohol, Serum <10 <10 mg/dL   Acetaminophen level    Collection Time: 02/04/24  9:55 PM   Result Value Ref Range    Acetaminophen (Tylenol), Serum 90.0 (H) 10.0 - 20.0 ug/mL   Comprehensive metabolic panel    Collection Time: 02/04/24  9:55 PM   Result Value Ref Range    Sodium 140 136 - 145 mmol/L    Potassium 3.9 3.5 - 5.1 mmol/L    Chloride 104 95 - 110 mmol/L    CO2 25 23 - 29 mmol/L    Glucose 99 70 - 110 mg/dL    BUN 8 6 - 20 mg/dL    Creatinine 1.1 0.5 - 1.4 mg/dL    Calcium 9.4 8.7 - 10.5 mg/dL    Total Protein 7.1 6.0 - 8.4 g/dL    Albumin 4.0 3.5 - 5.2 g/dL    Total Bilirubin 0.9 0.1 - 1.0 mg/dL    Alkaline Phosphatase 127 55 - 135 U/L    AST 32 10 - 40 U/L    ALT 34 10 - 44 U/L    eGFR >60 >60 mL/min/1.73 m^2    Anion Gap 11 8 - 16 mmol/L   TSH    Collection Time: 02/04/24  9:55 PM   Result Value Ref Range    TSH 2.898 0.400 - 4.000 uIU/mL   CBC auto differential    Collection Time: 02/04/24  9:55 PM   Result Value Ref Range    WBC 7.61 3.90 - 12.70 K/uL    RBC 5.51 4.60 - 6.20 M/uL    Hemoglobin 15.6 14.0 - 18.0 g/dL    Hematocrit 45.9 40.0 - 54.0 %    MCV 83 82 - 98 fL    MCH 28.3 27.0 - 31.0 pg    MCHC 34.0 32.0 - 36.0 g/dL    RDW 13.1 11.5 - 14.5 %    Platelets 249 150 - 450 K/uL    MPV 9.8 9.2 - 12.9 fL    Immature Granulocytes 0.3 0.0 - 0.5 %    Gran # (ANC) 5.4 1.8 - 7.7 K/uL    Immature Grans (Abs) 0.02 0.00 - 0.04 K/uL    Lymph # 1.6 1.0 - 4.8 K/uL    Mono # 0.6 0.3 - 1.0 K/uL    Eos # 0.0 0.0 - 0.5 K/uL    Baso # 0.02 0.00 - 0.20 K/uL    nRBC 0 0 /100 WBC    Gran % 70.7 38.0 - 73.0 %    Lymph % 20.5 18.0 - 48.0 %    Mono % 7.8 4.0 - 15.0 %    Eosinophil % 0.4 0.0 - 8.0 %    Basophil % 0.3 0.0 - 1.9 %     Differential Method Automated    Salicylate level    Collection Time: 02/04/24  9:55 PM   Result Value Ref Range    Salicylate Lvl <5.0 (L) 15.0 - 30.0 mg/dL       RADIOLOGY  No results found.    EKG    MICROBIOLOGY    MDM

## 2024-02-05 NOTE — ASSESSMENT & PLAN NOTE
Patient with significant psychiatric history including bipolar, anxiety, depression, and schizophrenia presented with attempted suicide via adjusted multiple on Tylenol pills.  Patient is still endorsing suicidal ideations as well as paranoia and auditory hallucinations.  ED workup fairly unremarkable with the exception of acetaminophen level measuring 90 and urine toxicology positive for methamphetamines.  Patient remains hemodynamically stable and currently under PEC.  Patient evaluated by telepsychiatry with recommendations for inpatient psychiatric hospitalization following medical clearance.  Patient placed on Geodon 40 mg p.o./IM q.6 hours p.r.n. for non redirectable agitation, kwadwo, breakthrough psychosis. Patient also started on NAC treatment for Tylenol overdose and admitted to Hospital Medicine.  Plan:  -telemetry  -continue NAC treatment  -trend Tylenol levels  -IVFs  -continue sitter  -continue PEC  -continue p.r.n Geodon  -f/u psychiatry for inpatient psych placement

## 2024-02-05 NOTE — NURSING
Collected pt's belongings and locked up in cabinet on the 40s vick    Grey shirt  Grey pants  Dark brown wallet w/:  Hotel key  [place] rewards visa  [employer] healthcare rx card  Neon yellow debit visa  Credit karma debit visa  [employer] plasma debit card  Paypal debit card  [place] debit visa  [employer] one bank platinum visa  [employer] one platinum visa  SSN card  1$

## 2024-02-06 VITALS
WEIGHT: 174.19 LBS | RESPIRATION RATE: 18 BRPM | BODY MASS INDEX: 24.94 KG/M2 | HEART RATE: 74 BPM | OXYGEN SATURATION: 95 % | TEMPERATURE: 98 F | SYSTOLIC BLOOD PRESSURE: 115 MMHG | DIASTOLIC BLOOD PRESSURE: 63 MMHG | HEIGHT: 70 IN

## 2024-02-06 LAB
ALBUMIN SERPL BCP-MCNC: 3.1 G/DL (ref 3.5–5.2)
ALP SERPL-CCNC: 98 U/L (ref 55–135)
ALT SERPL W/O P-5'-P-CCNC: 28 U/L (ref 10–44)
ANION GAP SERPL CALC-SCNC: 7 MMOL/L (ref 8–16)
AST SERPL-CCNC: 18 U/L (ref 10–40)
BASOPHILS # BLD AUTO: 0.03 K/UL (ref 0–0.2)
BASOPHILS NFR BLD: 0.6 % (ref 0–1.9)
BILIRUB SERPL-MCNC: 0.6 MG/DL (ref 0.1–1)
BUN SERPL-MCNC: 7 MG/DL (ref 6–20)
CALCIUM SERPL-MCNC: 8.6 MG/DL (ref 8.7–10.5)
CHLORIDE SERPL-SCNC: 106 MMOL/L (ref 95–110)
CO2 SERPL-SCNC: 26 MMOL/L (ref 23–29)
CREAT SERPL-MCNC: 0.9 MG/DL (ref 0.5–1.4)
DIFFERENTIAL METHOD BLD: ABNORMAL
EOSINOPHIL # BLD AUTO: 0.1 K/UL (ref 0–0.5)
EOSINOPHIL NFR BLD: 2.2 % (ref 0–8)
ERYTHROCYTE [DISTWIDTH] IN BLOOD BY AUTOMATED COUNT: 13.2 % (ref 11.5–14.5)
EST. GFR  (NO RACE VARIABLE): >60 ML/MIN/1.73 M^2
GLUCOSE SERPL-MCNC: 84 MG/DL (ref 70–110)
HCT VFR BLD AUTO: 39.8 % (ref 40–54)
HGB BLD-MCNC: 13.3 G/DL (ref 14–18)
IMM GRANULOCYTES # BLD AUTO: 0.01 K/UL (ref 0–0.04)
IMM GRANULOCYTES NFR BLD AUTO: 0.2 % (ref 0–0.5)
INR PPP: 1.1 (ref 0.8–1.2)
LYMPHOCYTES # BLD AUTO: 2.1 K/UL (ref 1–4.8)
LYMPHOCYTES NFR BLD: 41.4 % (ref 18–48)
MCH RBC QN AUTO: 27.9 PG (ref 27–31)
MCHC RBC AUTO-ENTMCNC: 33.4 G/DL (ref 32–36)
MCV RBC AUTO: 84 FL (ref 82–98)
MONOCYTES # BLD AUTO: 0.4 K/UL (ref 0.3–1)
MONOCYTES NFR BLD: 8.7 % (ref 4–15)
NEUTROPHILS # BLD AUTO: 2.4 K/UL (ref 1.8–7.7)
NEUTROPHILS NFR BLD: 46.9 % (ref 38–73)
NRBC BLD-RTO: 0 /100 WBC
PLATELET # BLD AUTO: 184 K/UL (ref 150–450)
PMV BLD AUTO: 10.2 FL (ref 9.2–12.9)
POTASSIUM SERPL-SCNC: 4 MMOL/L (ref 3.5–5.1)
PROT SERPL-MCNC: 5.1 G/DL (ref 6–8.4)
PROTHROMBIN TIME: 11.8 SEC (ref 9–12.5)
RBC # BLD AUTO: 4.76 M/UL (ref 4.6–6.2)
SODIUM SERPL-SCNC: 139 MMOL/L (ref 136–145)
WBC # BLD AUTO: 5.05 K/UL (ref 3.9–12.7)

## 2024-02-06 PROCEDURE — 94799 UNLISTED PULMONARY SVC/PX: CPT | Mod: XB

## 2024-02-06 PROCEDURE — 99900035 HC TECH TIME PER 15 MIN (STAT)

## 2024-02-06 PROCEDURE — 63600175 PHARM REV CODE 636 W HCPCS: Performed by: HOSPITALIST

## 2024-02-06 PROCEDURE — 94799 UNLISTED PULMONARY SVC/PX: CPT

## 2024-02-06 PROCEDURE — 85610 PROTHROMBIN TIME: CPT | Performed by: STUDENT IN AN ORGANIZED HEALTH CARE EDUCATION/TRAINING PROGRAM

## 2024-02-06 PROCEDURE — 36415 COLL VENOUS BLD VENIPUNCTURE: CPT | Performed by: STUDENT IN AN ORGANIZED HEALTH CARE EDUCATION/TRAINING PROGRAM

## 2024-02-06 PROCEDURE — 80053 COMPREHEN METABOLIC PANEL: CPT | Performed by: HOSPITALIST

## 2024-02-06 PROCEDURE — 85025 COMPLETE CBC W/AUTO DIFF WBC: CPT | Performed by: HOSPITALIST

## 2024-02-06 RX ADMIN — SODIUM CHLORIDE, POTASSIUM CHLORIDE, SODIUM LACTATE AND CALCIUM CHLORIDE: 600; 310; 30; 20 INJECTION, SOLUTION INTRAVENOUS at 06:02

## 2024-02-06 NOTE — DISCHARGE SUMMARY
"[facility] Medicine  Discharge Summary      Patient Name: Josh Tucker  MRN: [id number]  ENRIQUE: [id number]  Patient Class: IP- Inpatient  Admission Date: 2/4/2024  Hospital Length of Stay: 2 days  Discharge Date and Time: 2/6/24  Attending Physician: Padilla Roy,*   Discharging Provider: Padilla Roy MD  Primary Care Provider: Lesa, Primary Doctor    Primary Care Team: Networked reference to record PCT     HPI:   Josh Tucker is a 48 y.o. male with a PMH  has a past medical history of Bipolar disorder, unspecified, Schizophrenia, unspecified, and Substance abuse. who presented to the ED via EMS for further evaluation of attempted suicide.  Patient reportedly was experiencing suicidal ideations following augment with his sister as well as having auditory hallucinations followed by taking approximately 20 extra-strength Tylenol to end his life.  He reports hearing voices "saying hateful stuff" and feels people are watching him.  Patient reported using methamphetamine yesterday prior to taking the Tylenol and is still endorsing both suicidal and homicidal ideations.  He reports he is not currently on any psychiatric medications outpatient and not currently following psychiatry outpatient.  Patient denied endorsing any other concerns or complaints at time of bedside assessment and reported being in his usual state of health prior to onset of symptoms.  Initial workup in the ED fairly unremarkable with the exception of acetaminophen level measuring 90 and urine toxicology positive for amphetamines.  Patient evaluated by tele psychiatry with recommendations to place patient under PEC followed by inpatient psychiatric admission following medical clearance.  Patient initiated on NAC and admitted to Hospital Medicine inpatient for continued medical management.    PCP: Lesa, Primary Doctor      * No surgery found *      Hospital Course:   Josh Tucker is a 48 y.o. " "male with a PMH  has a past medical history of Bipolar disorder, unspecified, Schizophrenia, unspecified, and Substance abuse. who presented to the ED via EMS for further evaluation of attempted suicide.  Patient reportedly was experiencing suicidal ideations following augment with his sister as well as having auditory hallucinations followed by taking approximately 20 extra-strength Tylenol to end his life.  He reports hearing voices "saying hateful stuff" and feels people are watching him.  Patient reported using methamphetamine yesterday prior to taking the Tylenol and is still endorsing both suicidal and homicidal ideations.  He reports he is not currently on any psychiatric medications outpatient and not currently following psychiatry outpatient.  Patient denied endorsing any other concerns or complaints at time of bedside assessment and reported being in his usual state of health prior to onset of symptoms.  Initial workup in the ED fairly unremarkable with the exception of acetaminophen level measuring 90 and urine toxicology positive for amphetamines.  Patient evaluated by tele psychiatry with recommendations to place patient under PEC followed by inpatient psychiatric admission following medical clearance.  Patient initiated on NAC and admitted to Hospital Medicine inpatient for continued medical management.     On 02/05/2024, on NAC; follow up on LFTs, INR, salicylate levels, currently PEC'd per psych recommendations, patient is still expressing suicidal ideation, sitter at bedside, once NAC completed, based on clinical/lab condition plan to transfer to inpatient psych unit accordingly  2/6/24- examination of patient and bedside, appeared alert and oriented x3, still expressing suicidal ideation.  Sitter at bedside.  Currently PEC'd.  appeared clinically/medically stable, completed NAC, Tylenol level less than 5, LFTs within normal limit, INR within normal range, patient denied nausea, vomiting, abdominal " pain, tolerating diet.    Patient deemed medically stable/ cleared at this time - to get transferred to inpatient psych unit.     working on arrangements for transferring patient to inpatient psych unit.          Goals of Care Treatment Preferences:  Code Status: Full Code      Consults:   Consults (From admission, onward)          Status Ordering Provider     Inpatient consult to [facility]  Once        Provider:  (Not yet assigned)    Completed ASPEN VIEIRA JR            No new Assessment & Plan notes have been filed under this hospital service since the last note was generated.  Service: Hospital Medicine    Final Active Diagnoses:    Diagnosis Date Noted POA    PRINCIPAL PROBLEM:  Suicidal behavior with attempted self-injury [T14.91XA] 02/04/2024 Yes    Bipolar disorder, unspecified [F31.9] 02/05/2024 Yes    Schizophrenia [F20.9] 02/05/2024 Yes    Intentional acetaminophen overdose [T39.1X2A] 02/04/2024 Yes    Methamphetamine use [F15.10] 02/04/2024 Yes    Auditory hallucinations [R44.0] 02/04/2024 Yes      Problems Resolved During this Admission:       Discharged Condition: fair    Disposition: Psychiatric Hospital    Follow Up:   Follow-up Information       No, Primary Doctor Follow up in 1 week(s).                           Patient Instructions:   No discharge procedures on file.    Significant Diagnostic Studies:     Results for orders placed or performed during the hospital encounter of 02/04/24   HIV 1/2 Ag/Ab (4th Gen)   Result Value Ref Range    HIV 1/2 Ag/Ab Negative Negative   Hepatitis C Antibody   Result Value Ref Range    Hepatitis C Ab Negative Negative   HCV Virus Hold Specimen   Result Value Ref Range    HEP C Virus Hold Specimen Hold for HCV sendout    Urinalysis, Reflex to Urine Culture Urine, Clean Catch    Specimen: Urine   Result Value Ref Range    Specimen UA Urine, Clean Catch     Color, UA Yellow Yellow, Straw, Tish    Appearance, UA Clear Clear    pH, UA 6.0 5.0 -  8.0    Specific Gravity, UA 1.010 1.005 - 1.030    Protein, UA Negative Negative    Glucose, UA Negative Negative    Ketones, UA Trace (A) Negative    Bilirubin (UA) Negative Negative    Occult Blood UA Negative Negative    Nitrite, UA Negative Negative    Urobilinogen, UA Negative <2.0 EU/dL    Leukocytes, UA Negative Negative   Ethanol   Result Value Ref Range    Alcohol, Serum <10 <10 mg/dL   Acetaminophen level   Result Value Ref Range    Acetaminophen (Tylenol), Serum 90.0 (H) 10.0 - 20.0 ug/mL   Comprehensive metabolic panel   Result Value Ref Range    Sodium 140 136 - 145 mmol/L    Potassium 3.9 3.5 - 5.1 mmol/L    Chloride 104 95 - 110 mmol/L    CO2 25 23 - 29 mmol/L    Glucose 99 70 - 110 mg/dL    BUN 8 6 - 20 mg/dL    Creatinine 1.1 0.5 - 1.4 mg/dL    Calcium 9.4 8.7 - 10.5 mg/dL    Total Protein 7.1 6.0 - 8.4 g/dL    Albumin 4.0 3.5 - 5.2 g/dL    Total Bilirubin 0.9 0.1 - 1.0 mg/dL    Alkaline Phosphatase 127 55 - 135 U/L    AST 32 10 - 40 U/L    ALT 34 10 - 44 U/L    eGFR >60 >60 mL/min/1.73 m^2    Anion Gap 11 8 - 16 mmol/L   TSH   Result Value Ref Range    TSH 2.898 0.400 - 4.000 uIU/mL   CBC auto differential   Result Value Ref Range    WBC 7.61 3.90 - 12.70 K/uL    RBC 5.51 4.60 - 6.20 M/uL    Hemoglobin 15.6 14.0 - 18.0 g/dL    Hematocrit 45.9 40.0 - 54.0 %    MCV 83 82 - 98 fL    MCH 28.3 27.0 - 31.0 pg    MCHC 34.0 32.0 - 36.0 g/dL    RDW 13.1 11.5 - 14.5 %    Platelets 249 150 - 450 K/uL    MPV 9.8 9.2 - 12.9 fL    Immature Granulocytes 0.3 0.0 - 0.5 %    Gran # (ANC) 5.4 1.8 - 7.7 K/uL    Immature Grans (Abs) 0.02 0.00 - 0.04 K/uL    Lymph # 1.6 1.0 - 4.8 K/uL    Mono # 0.6 0.3 - 1.0 K/uL    Eos # 0.0 0.0 - 0.5 K/uL    Baso # 0.02 0.00 - 0.20 K/uL    nRBC 0 0 /100 WBC    Gran % 70.7 38.0 - 73.0 %    Lymph % 20.5 18.0 - 48.0 %    Mono % 7.8 4.0 - 15.0 %    Eosinophil % 0.4 0.0 - 8.0 %    Basophil % 0.3 0.0 - 1.9 %    Differential Method Automated    Drug screen panel, emergency   Result Value Ref  Range    Benzodiazepines Negative Negative    Methadone metabolites Negative Negative    Cocaine (Metab.) Negative Negative    Opiate Scrn, Ur Negative Negative    Barbiturate Screen, Ur Negative Negative    Amphetamine Screen, Ur Presumptive Positive (A) Negative    THC Negative Negative    Phencyclidine Negative Negative    Creatinine, Urine 79.2 23.0 - 375.0 mg/dL    Toxicology Information SEE COMMENT    Salicylate level   Result Value Ref Range    Salicylate Lvl <5.0 (L) 15.0 - 30.0 mg/dL   Comprehensive Metabolic Panel (CMP)   Result Value Ref Range    Sodium 139 136 - 145 mmol/L    Potassium 3.3 (L) 3.5 - 5.1 mmol/L    Chloride 103 95 - 110 mmol/L    CO2 26 23 - 29 mmol/L    Glucose 105 70 - 110 mg/dL    BUN 9 6 - 20 mg/dL    Creatinine 1.1 0.5 - 1.4 mg/dL    Calcium 9.0 8.7 - 10.5 mg/dL    Total Protein 6.2 6.0 - 8.4 g/dL    Albumin 3.3 (L) 3.5 - 5.2 g/dL    Total Bilirubin 0.9 0.1 - 1.0 mg/dL    Alkaline Phosphatase 110 55 - 135 U/L    AST 25 10 - 40 U/L    ALT 34 10 - 44 U/L    eGFR >60 >60 mL/min/1.73 m^2    Anion Gap 10 8 - 16 mmol/L   CBC with Automated Differential   Result Value Ref Range    WBC 6.59 3.90 - 12.70 K/uL    RBC 5.20 4.60 - 6.20 M/uL    Hemoglobin 14.6 14.0 - 18.0 g/dL    Hematocrit 43.3 40.0 - 54.0 %    MCV 83 82 - 98 fL    MCH 28.1 27.0 - 31.0 pg    MCHC 33.7 32.0 - 36.0 g/dL    RDW 13.2 11.5 - 14.5 %    Platelets 232 150 - 450 K/uL    MPV 9.9 9.2 - 12.9 fL    Immature Granulocytes 0.2 0.0 - 0.5 %    Gran # (ANC) 3.7 1.8 - 7.7 K/uL    Immature Grans (Abs) 0.01 0.00 - 0.04 K/uL    Lymph # 2.1 1.0 - 4.8 K/uL    Mono # 0.6 0.3 - 1.0 K/uL    Eos # 0.1 0.0 - 0.5 K/uL    Baso # 0.03 0.00 - 0.20 K/uL    nRBC 0 0 /100 WBC    Gran % 56.2 38.0 - 73.0 %    Lymph % 31.9 18.0 - 48.0 %    Mono % 9.7 4.0 - 15.0 %    Eosinophil % 1.5 0.0 - 8.0 %    Basophil % 0.5 0.0 - 1.9 %    Differential Method Automated    Salicylate level   Result Value Ref Range    Salicylate Lvl <5.0 (L) 15.0 - 30.0 mg/dL    Protime-INR   Result Value Ref Range    Prothrombin Time 12.4 9.0 - 12.5 sec    INR 1.2 0.8 - 1.2   Acetaminophen level   Result Value Ref Range    Acetaminophen (Tylenol), Serum <3.0 (L) 10.0 - 20.0 ug/mL   Comprehensive Metabolic Panel (CMP)   Result Value Ref Range    Sodium 139 136 - 145 mmol/L    Potassium 4.0 3.5 - 5.1 mmol/L    Chloride 106 95 - 110 mmol/L    CO2 26 23 - 29 mmol/L    Glucose 84 70 - 110 mg/dL    BUN 7 6 - 20 mg/dL    Creatinine 0.9 0.5 - 1.4 mg/dL    Calcium 8.6 (L) 8.7 - 10.5 mg/dL    Total Protein 5.1 (L) 6.0 - 8.4 g/dL    Albumin 3.1 (L) 3.5 - 5.2 g/dL    Total Bilirubin 0.6 0.1 - 1.0 mg/dL    Alkaline Phosphatase 98 55 - 135 U/L    AST 18 10 - 40 U/L    ALT 28 10 - 44 U/L    eGFR >60 >60 mL/min/1.73 m^2    Anion Gap 7 (L) 8 - 16 mmol/L   CBC with Automated Differential   Result Value Ref Range    WBC 5.05 3.90 - 12.70 K/uL    RBC 4.76 4.60 - 6.20 M/uL    Hemoglobin 13.3 (L) 14.0 - 18.0 g/dL    Hematocrit 39.8 (L) 40.0 - 54.0 %    MCV 84 82 - 98 fL    MCH 27.9 27.0 - 31.0 pg    MCHC 33.4 32.0 - 36.0 g/dL    RDW 13.2 11.5 - 14.5 %    Platelets 184 150 - 450 K/uL    MPV 10.2 9.2 - 12.9 fL    Immature Granulocytes 0.2 0.0 - 0.5 %    Gran # (ANC) 2.4 1.8 - 7.7 K/uL    Immature Grans (Abs) 0.01 0.00 - 0.04 K/uL    Lymph # 2.1 1.0 - 4.8 K/uL    Mono # 0.4 0.3 - 1.0 K/uL    Eos # 0.1 0.0 - 0.5 K/uL    Baso # 0.03 0.00 - 0.20 K/uL    nRBC 0 0 /100 WBC    Gran % 46.9 38.0 - 73.0 %    Lymph % 41.4 18.0 - 48.0 %    Mono % 8.7 4.0 - 15.0 %    Eosinophil % 2.2 0.0 - 8.0 %    Basophil % 0.6 0.0 - 1.9 %    Differential Method Automated    Protime-INR   Result Value Ref Range    Prothrombin Time 11.8 9.0 - 12.5 sec    INR 1.1 0.8 - 1.2      Imaging Results    None          Pending Diagnostic Studies:       None           Medications:       Medication List        CONTINUE taking these medications      buPROPion 300 MG 24 hr tablet  Commonly known as: WELLBUTRIN XL  Take 1 tablet (300 mg total) by mouth  once daily.     hydrOXYzine 50 MG tablet  Commonly known as: ATARAX  Take 1 tablet (50 mg total) by mouth every 6 (six) hours as needed for Anxiety or Itching.     melatonin 3 mg tablet  Commonly known as: MELATIN  Take 2 tablets (6 mg total) by mouth nightly.     QUEtiapine 50 MG tablet  Commonly known as: SEROQUEL     sertraline 100 MG tablet  Commonly known as: ZOLOFT  Take 1 tablet (100 mg total) by mouth once daily.     traZODone 100 MG tablet  Commonly known as: DESYREL  Take 2 tablets (200 mg total) by mouth every evening.               Indwelling Lines/Drains at time of discharge:   Lines/Drains/Airways       None                   Time spent on the discharge of patient: 87 minutes         Padilla Roy MD  Department of Hospital Medicine  [facility]

## 2024-02-06 NOTE — PLAN OF CARE
Purposeful rounding. Pt has sitter at bedside      Cardiac monitoring in use,, tele monitor #[id number]    Fall precautions in place, remains injury free    Pain and nausea under control with PRN meds    IVFs    Bed locked at lowest position  Call light within reach    Chart check complete  Will cont with POC    Pt denies c/o anything. Pt did not voice any concerns

## 2024-02-06 NOTE — PLAN OF CARE
Pt being discharged Home in stable condition. IV removed, catheter intact, pt tolerated well. Tele monitor removed, given to US. Discharge instructions given to pt, pt verbalized understanding. Report called and given to [facility] unit.

## 2024-02-06 NOTE — CARE UPDATE
Update      Mr. Tucker, appeared clinically/medically stable, completed NAC, Tylenol level less than 5, LFTs within normal limit, INR within normal range, patient denied nausea, vomiting, abdominal pain, tolerating diet.    Patient deemed medically stable/ cleared at this time - to get transferred to inpatient psych unit.      
Clothing

## 2024-02-06 NOTE — PLAN OF CARE
[facility] - Med Surg  Discharge Final Note    Primary Care Provider: No, Primary Doctor    Expected Discharge Date: 2/6/2024    Final Discharge Note (most recent)       Final Note - 02/06/24 1007          Final Note    Assessment Type Final Discharge Note     Anticipated Discharge Disposition Psychiatric Hospital        Post-Acute Status    Discharge Delays None known at this time                   Plan for pt to d/c to an IP psych facility today.

## 2024-02-15 PROBLEM — F15.959 STIMULANT-INDUCED PSYCHOTIC DISORDER: Status: ACTIVE | Noted: 2024-02-15
